# Patient Record
Sex: MALE | Race: WHITE
[De-identification: names, ages, dates, MRNs, and addresses within clinical notes are randomized per-mention and may not be internally consistent; named-entity substitution may affect disease eponyms.]

---

## 2023-05-16 ENCOUNTER — HOSPITAL ENCOUNTER (OUTPATIENT)
Dept: HOSPITAL 47 - RADXRMAIN | Age: 63
Discharge: HOME | End: 2023-05-16
Attending: EMERGENCY MEDICINE
Payer: COMMERCIAL

## 2023-05-16 DIAGNOSIS — Z87.39: ICD-10-CM

## 2023-05-16 DIAGNOSIS — S83.92XA: Primary | ICD-10-CM

## 2023-05-16 DIAGNOSIS — M17.12: ICD-10-CM

## 2023-05-16 NOTE — XR
EXAMINATION TYPE: XR knee complete LT

 

DATE OF EXAM: 5/16/2023

 

COMPARISON: None

 

HISTORY: 63-year-old male S83.92 XA, left knee sprain

 

TECHNIQUE: 3 views

 

FINDINGS:  

There is a small knee joint effusion. Minimal degenerative spurring patellofemoral compartment. No ac
malissa fracture, subluxation, dislocation.

 

IMPRESSION:  

There appears to be a small effusion in the knee which is nonspecific. Minimal degenerative spurring 
in the patellofemoral compartment. No acute osseous abnormality seen.

## 2024-11-10 ENCOUNTER — HOSPITAL ENCOUNTER (EMERGENCY)
Dept: HOSPITAL 47 - EC | Age: 64
Discharge: HOME | End: 2024-11-10
Payer: COMMERCIAL

## 2024-11-10 VITALS — TEMPERATURE: 98.5 F

## 2024-11-10 VITALS — DIASTOLIC BLOOD PRESSURE: 88 MMHG | SYSTOLIC BLOOD PRESSURE: 162 MMHG | RESPIRATION RATE: 16 BRPM | HEART RATE: 73 BPM

## 2024-11-10 DIAGNOSIS — Z88.0: ICD-10-CM

## 2024-11-10 DIAGNOSIS — S39.012A: Primary | ICD-10-CM
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
## 2024-11-10 LAB
GRAN CASTS UR QL COMP ASSIST: 5 /LPF
MIXED CELL CASTS UR QL COMP ASSIST: 5 /LPF
PH UR: 5.5 [PH] (ref 5–8)
RBC UR QL: <1 /HPF (ref 0–5)
SP GR UR: 1.02 (ref 1–1.03)
SQUAMOUS UR QL AUTO: <1 /HPF (ref 0–4)
UROBILINOGEN UR QL STRIP: <2 MG/DL (ref ?–2)
WBC #/AREA URNS HPF: 6 /HPF (ref 0–5)

## 2024-11-10 PROCEDURE — 99283 EMERGENCY DEPT VISIT LOW MDM: CPT

## 2024-11-10 PROCEDURE — 81001 URINALYSIS AUTO W/SCOPE: CPT

## 2024-11-10 PROCEDURE — 96372 THER/PROPH/DIAG INJ SC/IM: CPT

## 2024-11-10 RX ADMIN — ORPHENADRINE CITRATE STA MG: 30 INJECTION, SOLUTION INTRAMUSCULAR; INTRAVENOUS at 21:23

## 2024-11-10 RX ADMIN — LIDOCAINE ONE PATCH: 4 PATCH TOPICAL at 21:25

## 2024-11-10 RX ADMIN — KETOROLAC TROMETHAMINE STA MG: 15 INJECTION, SOLUTION INTRAMUSCULAR; INTRAVENOUS at 22:51

## 2024-11-10 RX ADMIN — MORPHINE SULFATE STA MG: 4 INJECTION, SOLUTION INTRAMUSCULAR; INTRAVENOUS at 22:50

## 2024-11-28 ENCOUNTER — HOSPITAL ENCOUNTER (INPATIENT)
Dept: HOSPITAL 47 - EC | Age: 64
LOS: 6 days | Discharge: HOME | DRG: 552 | End: 2024-12-04
Attending: INTERNAL MEDICINE | Admitting: INTERNAL MEDICINE
Payer: COMMERCIAL

## 2024-11-28 DIAGNOSIS — T38.0X5A: ICD-10-CM

## 2024-11-28 DIAGNOSIS — L40.50: ICD-10-CM

## 2024-11-28 DIAGNOSIS — M51.16: ICD-10-CM

## 2024-11-28 DIAGNOSIS — R19.7: ICD-10-CM

## 2024-11-28 DIAGNOSIS — M47.26: Primary | ICD-10-CM

## 2024-11-28 DIAGNOSIS — K64.9: ICD-10-CM

## 2024-11-28 DIAGNOSIS — K92.1: ICD-10-CM

## 2024-11-28 DIAGNOSIS — G89.29: ICD-10-CM

## 2024-11-28 DIAGNOSIS — Z79.899: ICD-10-CM

## 2024-11-28 DIAGNOSIS — M43.16: ICD-10-CM

## 2024-11-28 DIAGNOSIS — M48.062: ICD-10-CM

## 2024-11-28 DIAGNOSIS — Z87.19: ICD-10-CM

## 2024-11-28 DIAGNOSIS — Z86.61: ICD-10-CM

## 2024-11-28 DIAGNOSIS — K59.00: ICD-10-CM

## 2024-11-28 DIAGNOSIS — I15.8: ICD-10-CM

## 2024-11-28 LAB
ANION GAP SERPL CALC-SCNC: 8 MMOL/L
BASOPHILS # BLD AUTO: 0 K/UL (ref 0–0.2)
BASOPHILS NFR BLD AUTO: 0 %
BUN SERPL-SCNC: 21 MG/DL (ref 9–20)
CALCIUM SPEC-MCNC: 9.4 MG/DL (ref 8.4–10.2)
CHLORIDE SERPL-SCNC: 99 MMOL/L (ref 98–107)
CO2 SERPL-SCNC: 28 MMOL/L (ref 22–30)
EOSINOPHIL # BLD AUTO: 0.1 K/UL (ref 0–0.7)
EOSINOPHIL NFR BLD AUTO: 0 %
ERYTHROCYTE [DISTWIDTH] IN BLOOD BY AUTOMATED COUNT: 5.01 M/UL (ref 4.3–5.9)
ERYTHROCYTE [DISTWIDTH] IN BLOOD: 12.2 % (ref 11.5–15.5)
GLUCOSE SERPL-MCNC: 99 MG/DL (ref 74–99)
HCT VFR BLD AUTO: 45.1 % (ref 39–53)
HGB BLD-MCNC: 15.5 GM/DL (ref 13–17.5)
LYMPHOCYTES # SPEC AUTO: 2.3 K/UL (ref 1–4.8)
LYMPHOCYTES NFR SPEC AUTO: 16 %
MCH RBC QN AUTO: 30.8 PG (ref 25–35)
MCHC RBC AUTO-ENTMCNC: 34.3 G/DL (ref 31–37)
MCV RBC AUTO: 90 FL (ref 80–100)
MONOCYTES # BLD AUTO: 0.8 K/UL (ref 0–1)
MONOCYTES NFR BLD AUTO: 6 %
NEUTROPHILS # BLD AUTO: 10.6 K/UL (ref 1.3–7.7)
NEUTROPHILS NFR BLD AUTO: 76 %
PLATELET # BLD AUTO: 282 K/UL (ref 150–450)
POTASSIUM SERPL-SCNC: 3.2 MMOL/L (ref 3.5–5.1)
SODIUM SERPL-SCNC: 135 MMOL/L (ref 137–145)
WBC # BLD AUTO: 13.9 K/UL (ref 3.8–10.6)

## 2024-11-28 PROCEDURE — 96376 TX/PRO/DX INJ SAME DRUG ADON: CPT

## 2024-11-28 PROCEDURE — 85025 COMPLETE CBC W/AUTO DIFF WBC: CPT

## 2024-11-28 PROCEDURE — 83735 ASSAY OF MAGNESIUM: CPT

## 2024-11-28 PROCEDURE — 96375 TX/PRO/DX INJ NEW DRUG ADDON: CPT

## 2024-11-28 PROCEDURE — 99285 EMERGENCY DEPT VISIT HI MDM: CPT

## 2024-11-28 PROCEDURE — 96374 THER/PROPH/DIAG INJ IV PUSH: CPT

## 2024-11-28 PROCEDURE — 83540 ASSAY OF IRON: CPT

## 2024-11-28 PROCEDURE — 62323 NJX INTERLAMINAR LMBR/SAC: CPT

## 2024-11-28 PROCEDURE — 82272 OCCULT BLD FECES 1-3 TESTS: CPT

## 2024-11-28 PROCEDURE — 82728 ASSAY OF FERRITIN: CPT

## 2024-11-28 PROCEDURE — 80048 BASIC METABOLIC PNL TOTAL CA: CPT

## 2024-11-28 PROCEDURE — 83550 IRON BINDING TEST: CPT

## 2024-11-28 RX ADMIN — HYDROMORPHONE HYDROCHLORIDE STA MG: 1 INJECTION, SOLUTION INTRAMUSCULAR; INTRAVENOUS; SUBCUTANEOUS at 10:25

## 2024-11-28 RX ADMIN — CYCLOBENZAPRINE HYDROCHLORIDE PRN MG: 10 TABLET, FILM COATED ORAL at 16:33

## 2024-11-28 RX ADMIN — KETOROLAC TROMETHAMINE STA MG: 15 INJECTION, SOLUTION INTRAMUSCULAR; INTRAVENOUS at 09:33

## 2024-11-28 RX ADMIN — DOCUSATE SODIUM AND SENNOSIDES SCH EACH: 50; 8.6 TABLET ORAL at 18:29

## 2024-11-28 RX ADMIN — HYDROMORPHONE HYDROCHLORIDE PRN MG: 1 INJECTION, SOLUTION INTRAMUSCULAR; INTRAVENOUS; SUBCUTANEOUS at 12:23

## 2024-11-28 RX ADMIN — DEXTROSE STA MG: 50 INJECTION, SOLUTION INTRAVENOUS at 12:21

## 2024-11-28 RX ADMIN — PANTOPRAZOLE SODIUM SCH MG: 40 INJECTION, POWDER, FOR SOLUTION INTRAVENOUS at 12:21

## 2024-11-28 RX ADMIN — ORPHENADRINE CITRATE STA MG: 30 INJECTION, SOLUTION INTRAMUSCULAR; INTRAVENOUS at 09:33

## 2024-11-28 RX ADMIN — FAMOTIDINE SCH MG: 20 TABLET, FILM COATED ORAL at 20:00

## 2024-11-28 RX ADMIN — KETOROLAC TROMETHAMINE PRN MG: 15 INJECTION, SOLUTION INTRAMUSCULAR; INTRAVENOUS at 15:40

## 2024-11-28 RX ADMIN — HYDROMORPHONE HYDROCHLORIDE STA MG: 1 INJECTION, SOLUTION INTRAMUSCULAR; INTRAVENOUS; SUBCUTANEOUS at 09:33

## 2024-11-28 RX ADMIN — DEXAMETHASONE SODIUM PHOSPHATE SCH MG: 4 INJECTION, SOLUTION INTRAMUSCULAR; INTRAVENOUS at 19:59

## 2024-11-28 RX ADMIN — HEPARIN SODIUM SCH UNIT: 5000 INJECTION, SOLUTION INTRAVENOUS; SUBCUTANEOUS at 19:59

## 2024-11-28 NOTE — P.HPIM
History of Present Illness





This is a pleasant 64 years old male who presents because of worsening back 

pain, wife at bedside


Patient states that he has back pain for 25 years.  Usually treated with topical

agents and follow-up with chiropractor


Since beginning of November about 1 month ago his pain was starting getting more

severe and was shooting sharp pain radiating to his right leg down to the knee. 

It was gradually getting worse.


He saw his orthopedic Dr. Browning in the outpatient on Monday who prescribed him 

Norco and prednisone 20 mg.


Today patient was taking a shower on his knees and hands because he cannot sit 

while he was trying to stand up his pain was more severe and sharp so he decided

to come to the emergency room


Patient is in mild to moderate distress because of his pain, stating currently 

7-8/10 in severity.


He denies weakness in his legs.  He denies losing control of his bowel or urine.

 He denies numbness in the saddle area but sometimes he got limb numbness, he 

thinks he has little numbness in the small area on his right thigh.


He denies smoking alcohol or illicit drugs


No chest pain or dyspnea.  No other GI/ symptoms.


Vitals are stable and he is afebrile.  Urine analysis showing no signs of 

infection


Currently there is no labs or imaging done





Review of Systems





Review of systems


CONSTITUTIONAL: No fever, no malaise, no fatigue. 


HEENT: No recent visual problems or hearing problems. Denied any sore throat. 


CARDIOVASCULAR: No  orthopnea, PND, no palpitations, no syncope. 


PULMONARY: No shortness of breath, no cough, no hemoptysis. 


GASTROINTESTINAL: No diarrhea, no nausea, no vomiting, no abdominal pain. 

Normoactive bowel sounds. 


NEUROLOGICAL: No headaches, no weakness, no numbness. 


HEMATOLOGICAL: Denies any bleeding or petechiae. 


GENITOURINARY: Denies any burning micturition, frequency, or urgency. 


MUSCULOSKELETAL/RHEUMATOLOGICAL: As above


ENDOCRINE: Denies any polyuria or polydipsia.








Past Medical History


Additional Past Medical History / Comment(s): PSORIATIC ARTHRITIS, HX 

ENCEPHALITIS AS CHILD


History of Any Multi-Drug Resistant Organisms: None Reported


Past Surgical History: Appendectomy, Hernia Repair, Orthopedic Surgery


Additional Past Surgical History / Comment(s): LEFT WRIST CYST REMOVED, RT THUMB

SX, "GLAND" REMOVED FROM NECK , left knee


Past Anesthesia/Blood Transfusion Reactions: No Reported Reaction


Past Psychological History: No Psychological Hx Reported


Smoking Status: Never smoker


Past Alcohol Use History: Occasional


Past Drug Use History: Marijuana





- Past Family History


  ** Mother


Family Medical History: No Reported History





Medications and Allergies


                                Home Medications











 Medication  Instructions  Recorded  Confirmed  Type


 


Etanercept [Enbrel] 50 mg SQ SA 09/20/16 11/28/24 History


 


Cyclobenzaprine [Flexeril] 10 mg PO TID PRN #15 tab 11/10/24 11/28/24 Rx


 


Famotidine [Pepcid] 20 mg PO BID 11/28/24 11/28/24 History


 


HYDROcodone/APAP 7.5-325MG [Norco 1 tab PO Q6H PRN 11/28/24 11/28/24 History





7.5-325]    


 


predniSONE [Deltasone] See Taper PO AS DIRECTED 11/28/24 11/28/24 History








                                    Allergies











Allergy/AdvReac Type Severity Reaction Status Date / Time


 


Penicillins Allergy  Rash/Hives Verified 11/28/24 11:28














Physical Exam


Vitals: 


                                   Vital Signs











  Temp Pulse Resp BP Pulse Ox


 


 11/28/24 10:19   78  20  162/104  97


 


 11/28/24 09:07  97.7 F  111 H  20  164/108  99








                                Intake and Output











 11/27/24 11/28/24 11/28/24





 22:59 06:59 14:59


 


Other:   


 


  Weight   83.915 kg














GENERAL: The patient is alert and oriented x3, not in any acute distress. Well 

developed, well nourished. 


HEENT: Pupils are round and equally reacting to light. EOMI. No scleral icterus.

 No conjunctival pallor. Normocephalic, atraumatic. No pharyngeal erythema. No 

thyromegaly. 


CARDIOVASCULAR: S1 and S2 present. No murmurs, rubs, or gallops. 


PULMONARY: Chest is clear to auscultation, no wheezing , no crackles. 


ABDOMEN: Soft, nontender, nondistended, normoactive bowel sounds. No palpable 

organomegaly. 


-MUSCULOSKELETAL: No joint swelling or deformity.  Patient moving his legs 

frequently because of pain.  No overt weakness


EXTREMITIES: No cyanosis, clubbing, or pedal edema. 


NEUROLOGICAL: Gross neurological examination did not reveal any focal deficits. 


SKIN: No rashes. no petechiae.








Assessment and Plan


Assessment: 





Severe acute on chronic back pain radiating to the right leg


History of lumbar spondylolisthesis as per patient








Plan: 





Continue with the steroids.


Pain management


Orthopedic team consult


Follow-up labs


Labs and medication were reviewed..  Continue same treatment.  Continue with 

symptomatic treatment.  Resume home medication.  Monitor labs and vitals.  DVT 

and GI prophylaxis.  Further recommendations as per clinical course of the 

patient


DVT prophylaxis: Subcutaneous heparin


GI Prophylaxis: Ppi


PT/OT: Pending, deferred


Prognosis is guarded

## 2024-11-28 NOTE — ED
General Adult HPI





- General


Chief complaint: Back Pain/Injury


Stated complaint: Lower back pain


Time Seen by Provider: 11/28/24 09:08


Source: patient, family, EMS, RN notes reviewed


Mode of arrival: EMS


Limitations: no limitations





- History of Present Illness


Initial comments: 





Patient is a 64-year-old male present to the emergency department with concerns 

with lower back pain.  Onset of symptoms was close to a month ago.  Patient has 

discomfort of his lower back radiating to the right leg.  No incontinence or 

retention of bowel or bladder.  No leg weakness or loss of sensation.  Patient 

did have similar episode around 25 years ago.  Patient has seen a back doctor in

[place] as well as Dr. Browning here.  Patient has also been to the emergency depar

tment 1 time.  Discomfort is severe at this time.  Discomfort got worse while 

standing up today.





- Related Data


                                Home Medications











 Medication  Instructions  Recorded  Confirmed


 


Etanercept [Enbrel] 50 mg SQ SA 09/20/16 11/28/24


 


Famotidine [Pepcid] 20 mg PO BID 11/28/24 11/28/24


 


HYDROcodone/APAP 7.5-325MG [Norco 1 tab PO Q6H PRN 11/28/24 11/28/24





7.5-325]   


 


predniSONE [Deltasone] See Taper PO AS DIRECTED 11/28/24 11/28/24








                                  Previous Rx's











 Medication  Instructions  Recorded


 


Cyclobenzaprine [Flexeril] 10 mg PO TID PRN #15 tab 11/10/24











                                    Allergies











Allergy/AdvReac Type Severity Reaction Status Date / Time


 


Penicillins Allergy  Rash/Hives Verified 11/28/24 11:28














Review of Systems


ROS Statement: 


Those systems with pertinent positive or pertinent negative responses have been 

documented in the HPI.





ROS Other: All systems not noted in ROS Statement are negative.


Constitutional: Denies: fever


Eyes: Denies: eye pain


ENT: Denies: ear pain


Respiratory: Denies: cough


Cardiovascular: Denies: chest pain


Endocrine: Denies: fatigue


Gastrointestinal: Denies: abdominal pain


Musculoskeletal: Reports: as per HPI, back pain


Neurological: Denies: weakness, numbness, paresthesias





Past Medical History


Additional Past Medical History / Comment(s): PSORIATIC ARTHRITIS, HX 

ENCEPHALITIS AS CHILD


History of Any Multi-Drug Resistant Organisms: None Reported


Past Surgical History: Appendectomy, Hernia Repair, Orthopedic Surgery


Additional Past Surgical History / Comment(s): LEFT WRIST CYST REMOVED, RT THUMB

 SX, "GLAND" REMOVED FROM NECK , left knee


Past Anesthesia/Blood Transfusion Reactions: No Reported Reaction


Past Psychological History: No Psychological Hx Reported


Smoking Status: Never smoker


Past Alcohol Use History: Occasional


Past Drug Use History: Marijuana





- Past Family History


  ** Mother


Family Medical History: No Reported History





General Exam


Limitations: no limitations


General appearance: alert


Head exam: Present: atraumatic, normocephalic


Eye exam: Present: normal appearance


Neck exam: Present: normal inspection


Respiratory exam: Present: normal lung sounds bilaterally


Cardiovascular Exam: Present: regular rate, normal rhythm


  ** Expanded


Peripheral pulses: 2+: Dorsalis Pedis (R), Dorsalis Pedis (L)


GI/Abdominal exam: Present: soft.  Absent: distended, tenderness, pulsatile mass


Extremities exam: Present: normal inspection


Back exam: Present: vertebral tenderness (Mild tenderness lower lumbar region)


Neurological exam: Present: alert.  Absent: motor sensory deficit


  ** Expanded


Sensory exam: Lower Extremity Light Touch: Normal


Motor strength exam: RLE: 5, LLE: 5


Eye Response: (4) open spontaneously


Motor Response: (6) obeys commands


Verbal Response: (5) oriented


Psychiatric exam: Present: normal affect, normal mood


Skin exam: Present: normal color





Course


                                   Vital Signs











  11/28/24 11/28/24





  09:07 10:19


 


Temperature 97.7 F 


 


Pulse Rate 111 H 78


 


Respiratory 20 20





Rate  


 


Blood Pressure 164/108 162/104


 


O2 Sat by Pulse 99 97





Oximetry  














Medical Decision Making





- Medical Decision Making





Was pt. sent in by a medical professional or institution (, PA, NP, urgent 

care, hospital, or nursing home...) When possible be specific


@  -No


Did you speak to anyone other than the patient for history (EMS, parent, family,

 police, friend...)? What history was obtained from this source 


@  -Wife is present and provides history including that she does not feel she is

 able to take care of him at home anymore


Did you review nursing and triage notes (agree or disagree)?  Why? 


@  -I reviewed and agree with nursing and triage notes


Were old charts reviewed (outside hosp., previous admission, EMS record, old 

EKG, old radiological studies, urgent care reports/EKG's, nursing home records)?

 Report findings 


@  -Reviewed previous x-ray results showing degenerative disc disease


Differential Diagnosis (chest pain, altered mental status, abdominal pain women,

 abdominal pain men, vaginal bleeding, weakness, fever, dyspnea, syncope, 

headache, dizziness, GI bleed, back pain, seizure, CVA, palpatations, mental 

health, musculoskeletal)? 


@  -Differential Back Pain:


Strain, zoster, cauda equina syndrome, epidural abscess, vertebral 

osteomyelitis, discitis, fracture, subluxation, disc herniation, DJD, spinal 

stenosis, dissection, AAA, pancreatitis, peptic ulcer disease, pyelonephritis, 

kidney stone, this is not meant to be an all-inclusive list.





EKG interpreted by me (3pts min.).


@  -As above


X-rays interpreted by me (1pt min.).


@  -None done


CT interpreted by me (1pt min.).


@  -None done


U/S interpreted by me (1pt. min.).


@  -None done


What testing was considered but not performed or refused? (CT, X-rays, U/S, 

labs)? Why?


@  -Considered imaging of the spine however patient has had previous x-ray and 

MRI


What meds were considered but not given or refused? Why?


@  -None


Did you discuss the management of the patient with other professionals 

(professionals i.e. , PA, NP, lab, RT, psych nurse, , , 

teacher, , )? Give summary


@  -Case was discussed with Dr. Spain who will admit covering hospital call


Was smoking cessation discussed for >3mins.?


@  -No


Was critical care preformed (if so, how long)?


@  -No


Were there social determinants of health that impacted care today? How? 

(Homelessness, low income, unemployed, alcoholism, drug addiction, 

transportation, low edu. Level, literacy, decrease access to med. care, group home, 

rehab)?


@  -No


Was there de-escalation of care discussed even if they declined (Discuss DNR or 

withdrawal of care, Hospice)? DNR status


@  -No


What co-morbidities impacted this encounter? (DM, HTN, Smoking, COPD, CAD, 

Cancer, CVA, ARF, Chemo, Hep., AIDS, mental health diagnosis, sleep apnea, 

morbid obesity)?


@  -None


Was patient admitted / discharged? Hospital course, mention meds given and 

route, prescriptions, significant lab abnormalities, going to OR and other 

pertinent info.


@  -Patient presents with increasing lower back pain.  Patient feels pain is 

severe and unable to go home.  Patient will be admitted with orthospine consult.

  Admission orders written.  Patient and family updated.


Undiagnosed new problem with uncertain prognosis?


@  -No


Drug Therapy requiring intensive monitoring for toxicity (Heparin, Nitro, 

Insulin, Cardizem)?


@  -No


Were any procedures done?


@  -No


Diagnosis/symptom?


@  -Back pain


Acute, or Chronic, or Acute on Chronic?


@  -Acute on chronic


Uncomplicated (without systemic symptoms) or Complicated (systemic symptoms)?


@  -Default


Side effects of treatment?


@  -No


Exacerbation, Progression, or Severe Exacerbation?


@  -No


Poses a threat to life or bodily function? How? (Chest pain, USA, MI, pneumonia,

 PE, COPD, DKA, ARF, appy, cholecystitis, CVA, Diverticulitis, Homicidal, 

Suicidal, threat to staff... and all critical care pts)


@  -No








Disposition


Clinical Impression: 


 Back pain





Disposition: ADMITTED AS IP TO THIS HOSP


Is patient prescribed a controlled substance at d/c from ED?: No


Referrals: 


None,Stated [Primary Care Provider] - 1-2 days


Time of Disposition: 11:55

## 2024-11-29 NOTE — P.CNOR
History of Present Illness





- HPI


Consult date: 11/29/24


Consult reason: low back pain


History of present illness: 





Patient is a pleasant 64-year-old male who is seen and examined at bedside.  He 

is accompanied by his wife.  He presented due to worsening low back pain.  He 

has a long history of back pain for the past 25 years but he has had acute 

flareup of the past 1 month since the beginning of November.  He says pain 

became sharp and shooting down his legs particular down his right leg to his 

thigh and his knee and has been getting worse.  We saw him in the office this 

past week and we started him on some medication and steroid medication.  He was 

not having any benefit and yesterday when he was trying to shower he felt severe

sharp pain in his back and his leg and presented to the emergency room.


He feels incapacitated due to his pain.  He does not feel weak in his legs.  He 

denies any changes bowel bladder function.  He says the pain is primarily in his

back towards the right buttocks and his right thigh.  He denies any weakness in 

his upper extremities or his neck.  He denies any chest pain shortness of 

breath.  Denies any nausea or vomiting.  He normally works but has not worked 

for the past month.


He had imaging at our office which showed listhesis and facet arthrosis with 

stenosis at the lower lumbar spine.





Review of Systems





As per HPI.  He denies any GI or  symptoms.  He is afebrile.  He denies any 

smoking or illicit drugs.  He denies any recent infections.  Denies any weakness

in his lower extremities.  He denies any new trauma.





Past Medical History


Additional Past Medical History / Comment(s): PSORIATIC ARTHRITIS, HX 

ENCEPHALITIS AS CHILD


History of Any Multi-Drug Resistant Organisms: None Reported


Past Surgical History: Appendectomy, Hernia Repair, Orthopedic Surgery


Additional Past Surgical History / Comment(s): LEFT WRIST CYST REMOVED, RT THUMB

SX, "GLAND" REMOVED FROM NECK , left knee


Past Anesthesia/Blood Transfusion Reactions: No Reported Reaction


Past Psychological History: No Psychological Hx Reported


Smoking Status: Never smoker


Past Alcohol Use History: Occasional


Past Drug Use History: Marijuana


Additional Drug Use History / Comment(s): OCCASIONAL USE, INSTRUCTED TO WITHOLD 

24 HRS PRIOR TO PROCEDURE states last marijuana was yesterday





- Past Family History


  ** Mother


Family Medical History: No Reported History





Medications and Allergies


                                Home Medications











 Medication  Instructions  Recorded  Confirmed  Type


 


Etanercept [Enbrel] 50 mg SQ SA 09/20/16 11/28/24 History


 


Cyclobenzaprine [Flexeril] 10 mg PO TID PRN #15 tab 11/10/24 11/28/24 Rx


 


Famotidine [Pepcid] 20 mg PO BID 11/28/24 11/28/24 History


 


HYDROcodone/APAP 7.5-325MG [Norco 1 tab PO Q6H PRN 11/28/24 11/28/24 History





7.5-325]    


 


predniSONE [Deltasone] See Taper PO AS DIRECTED 11/28/24 11/28/24 History








                                    Allergies











Allergy/AdvReac Type Severity Reaction Status Date / Time


 


Penicillins Allergy  Rash/Hives Verified 11/28/24 11:28














Physical Examination


Osteopathic Statement: *.  No significant issues noted on an osteopathic 

structural exam other than those noted in the History and Physical/Consult.





- L Spine: dermatomal strength & reflexes


  ** bilateral


Strength: hip flexion: 5/5 (His back has significant pain when he tries to move 

or change positions.  He has severe paravertebral spasm.  There is no open 

wounds lacerations or abrasions.)


Strength: knee flexion: 5/5 (He has 5 of 5 dorsiflexion plantarflexion EHL hip 

flexion knee extension.  No pain with internal extra rotation of his hips.  

Pelvis stable.  Abdomen soft nontender.)


Strength: knee extension: 5/5 (Chest has good excursion deep inspiration 

expiration next nontender to palpation range of motion.  Upper extremities have 

full active and passive range of motion intact)





Results





- Labs


Labs: 


                                      H & H











  11/28/24 Range/Units





  12:23 


 


Hgb  15.5  (13.0-17.5)  gm/dL


 


Hct  45.1  (39.0-53.0)  %











Result Diagrams: 


                                 11/28/24 12:23





                                 11/28/24 12:23





- Diagnostic results


Lumbar AP/lateral x-ray: report reviewed, image reviewed (He is old images from 

2023 of his lumbar spine but had new images at our office.  He has severe disc 

degeneration L4-5 L5-S1 with retrolisthesis L4-5 and spondylolisthesis L5-S1.  

There is spondylolysis at L5 bilaterally)





Assessment and Plan


Assessment: 





Acute on chronic low back pain, severe


Inability to mobilize due to back pain


Spondylolisthesis L4-5 L5-S1


Degenerative disc disease L4-5 L5-S1


Diffuse spondylosis lower lumbar spine without neurologic deficit


Lower extremity radiculopathy worse on the right than the left


Plan: 





Acute on chronic low back pain, severe


Inability to mobilize due to back pain


Spondylolisthesis L4-5 L5-S1


Degenerative disc disease L4-5 L5-S1


Diffuse spondylosis lower lumbar spine without neurologic deficit


Lower extremity radiculopathy worse on the right than the left








The patient has a number of changes at his lower lumbar spine which correlate 

well with his low back symptoms.  He has had acute exacerbation of his symptoms 

which have been unrelenting for him over the past 30 days and he feels like they

are worsening.  He has been trying oral steroid medication however this seems to

be failing him.  He is not having acute neurologic loss or deficit in his lower 

extremities.





His symptoms correlate well with his imaging findings which were done on 

outpatient outpatient basis.  I think he is a candidate for further interventi

on.  He may have some benefit with interventional pain management in the form of

epidural steroid or facet injections.  It is difficult to predict how long this 

may give him relief but I think is a reasonable option for him to see if it 

allows him to settle down some of his symptoms and regain some of his 

activities.





The patient is a candidate for surgical intervention in the form of decompressio

n and fusion at L4-5 and L5-S1.  I explained the nature of surgery with him.  I 

explained the risk complications all terms of benefits at length.  I answered 

his questions best my ability limb she understand.





At this point he is interested in continuing with conservative treatment and 

interventional pain management.  He understands that surgery may be a reasonable

option for him if he is not having significant improvement and continues to have

severe issues.  Will continue to follow him closely.





He will continue with the IV steroid medications pain medication and physical 

therapy did not help with his mobilization.

## 2024-11-29 NOTE — P.PN
Subjective





This is a pleasant 64 years old male who presents because of worsening back 

pain, wife at bedside


Patient states that he has back pain for 25 years.  Usually treated with topical

agents and follow-up with chiropractor


Since beginning of November about 1 month ago his pain was starting getting more

severe and was shooting sharp pain radiating to his right leg down to the knee. 

It was gradually getting worse.


He saw his orthopedic Dr. Browning in the outpatient on Monday who prescribed him 

Norco and prednisone 20 mg.


Today patient was taking a shower on his knees and hands because he cannot sit 

while he was trying to stand up his pain was more severe and sharp so he decided

to come to the emergency room


Patient is in mild to moderate distress because of his pain, stating currently 

7-8/10 in severity.


He denies weakness in his legs.  He denies losing control of his bowel or urine.

 He denies numbness in the saddle area but sometimes he got limb numbness, he 

thinks he has little numbness in the small area on his right thigh.


He denies smoking alcohol or illicit drugs


No chest pain or dyspnea.  No other GI/ symptoms.


Vitals are stable and he is afebrile.  Urine analysis showing no signs of 

infection


Currently there is no labs or imaging done





11/29


Patient still with severe low back pain as of yesterday


Despite his being on dexamethasone steroid and Dilaudid and Flexeril


His pain is worse when he tried to go to the restroom.


No other new complaints.








Objective





- Vital Signs


Vital signs: 


                                   Vital Signs











Temp  98.1 F   11/29/24 07:00


 


Pulse  97   11/29/24 07:00


 


Resp  16   11/29/24 07:00


 


BP  159/95   11/29/24 08:00


 


Pulse Ox  97   11/29/24 07:00


 


FiO2      








                                 Intake & Output











 11/28/24 11/29/24 11/29/24





 18:59 06:59 18:59


 


Intake Total  480 


 


Balance  480 


 


Weight 83.915 kg  


 


Intake:   


 


  Oral  480 


 


Other:   


 


  Voiding Method  Toilet 


 


  # Voids  3 














- Exam





GENERAL: The patient is alert and oriented x3, not in any acute distress. Well 

developed, well nourished. 


HEENT: Pupils are round and equally reacting to light. EOMI. No scleral icterus.

No conjunctival pallor. Normocephalic, atraumatic. No pharyngeal erythema. No 

thyromegaly. 


CARDIOVASCULAR: S1 and S2 present. No murmurs, rubs, or gallops. 


PULMONARY: Chest is clear to auscultation, no wheezing , no crackles. 


ABDOMEN: Soft, nontender, nondistended, normoactive bowel sounds. No palpable 

organomegaly. 


MUSCULOSKELETAL: No joint swelling or deformity. 


EXTREMITIES: No cyanosis, clubbing, or pedal edema. 


NEUROLOGICAL: Gross neurological examination did not reveal any focal deficits. 


SKIN: No rashes. no petechiae.








- Labs


CBC & Chem 7: 


                                 11/28/24 12:23





                                 11/28/24 12:23


Labs: 


                  Abnormal Lab Results - Last 24 Hours (Table)











  11/28/24 11/28/24 Range/Units





  12:23 12:23 


 


WBC  13.9 H   (3.8-10.6)  k/uL


 


Neutrophils #  10.6 H   (1.3-7.7)  k/uL


 


Sodium   135 L  (137-145)  mmol/L


 


Potassium   3.2 L  (3.5-5.1)  mmol/L


 


BUN   21 H  (9-20)  mg/dL














Assessment and Plan


Assessment: 





Severe acute on chronic back pain radiating to the right leg


History of lumbar spondylolisthesis as per patient








Plan: 





Continue with the steroids.


Pain management


Orthopedic team consult


Follow-up labs


Labs and medication were reviewed..  Continue same treatment.  Continue with 

symptomatic treatment.  Resume home medication.  Monitor labs and vitals.  DVT 

and GI prophylaxis.  Further recommendations as per clinical course of the 

patient


DVT prophylaxis: Subcutaneous heparin


GI Prophylaxis: Ppi


PT/OT: Pending, deferred


Prognosis is guarded benadryl 50 mg ,decadron 12 mg ivp/IV/medications

## 2024-11-30 LAB
ANION GAP SERPL CALC-SCNC: 12 MMOL/L (ref 4–12)
BUN SERPL-SCNC: 25.8 MG/DL (ref 9–27)
BUN/CREAT SERPL: 25.8 RATIO (ref 12–20)
CALCIUM SPEC-MCNC: 9.7 MG/DL (ref 8.7–10.3)
CHLORIDE SERPL-SCNC: 100 MMOL/L (ref 96–109)
CO2 SERPL-SCNC: 26 MMOL/L (ref 21.6–31.8)
GLUCOSE SERPL-MCNC: 127 MG/DL (ref 70–110)
MAGNESIUM SPEC-SCNC: 2.2 MG/DL (ref 1.5–2.4)
POTASSIUM SERPL-SCNC: 4.6 MMOL/L (ref 3.5–5.5)
SODIUM SERPL-SCNC: 138 MMOL/L (ref 135–145)

## 2024-11-30 RX ADMIN — ASPIRIN SCH: 325 TABLET ORAL at 08:44

## 2024-11-30 NOTE — P.PN
Progress Note - Text


Progress Note Date: 11/30/24





The patient is seen and examined bedside.  His wife is present with him.  He 

says he had a better night last night.  He still requiring regular medications. 

But he has been getting up to get to the bathroom but then back in bed.  He 

feels that is about as far as he can walk.





He is afebrile stable vital signs


His lower extremities have 5-5 muscle strength dorsiflexion plantarflexion EHL 

hip flexion knee extension.  Calves thighs soft nontender








Acute on chronic low back pain


Spondylolisthesis L4-5 L5-S1


Degenerative disc disease


Lower EXTR and radiculopathy








The patient may have had some improvement with his pain symptoms.  It is 

possible that the steroid IV is giving him some relief.  Hopefully he will be 

able to continue to mobilize and make gains in terms of his pain control.  Pain 

management has been consulted for the possibility of epidural steroid injection 

which the patient plans to proceed with when they are available on Monday.

## 2024-11-30 NOTE — P.PN
Subjective





This is a pleasant 64 years old male who presents because of worsening back 

pain, wife at bedside


Patient states that he has back pain for 25 years.  Usually treated with topical

agents and follow-up with chiropractor


Since beginning of November about 1 month ago his pain was starting getting more

severe and was shooting sharp pain radiating to his right leg down to the knee. 

It was gradually getting worse.


He saw his orthopedic Dr. Browning in the outpatient on Monday who prescribed him 

Norco and prednisone 20 mg.


Today patient was taking a shower on his knees and hands because he cannot sit 

while he was trying to stand up his pain was more severe and sharp so he decided

to come to the emergency room


Patient is in mild to moderate distress because of his pain, stating currently 

7-8/10 in severity.


He denies weakness in his legs.  He denies losing control of his bowel or urine.

 He denies numbness in the saddle area but sometimes he got limb numbness, he 

thinks he has little numbness in the small area on his right thigh.


He denies smoking alcohol or illicit drugs


No chest pain or dyspnea.  No other GI/ symptoms.


Vitals are stable and he is afebrile.  Urine analysis showing no signs of 

infection


Currently there is no labs or imaging done





11/29


Patient still with severe low back pain as of yesterday


Despite his being on dexamethasone steroid and Dilaudid and Flexeril


His pain is worse when he tried to go to the restroom.


No other new complaints.








11/30


Patient back pain is slightly improving.  While continued on IV steroids


Orthopedic team evaluated the patient and recommended pain management service 

which is not available till Monday.  Patient agrees to stay


Patient have somewhat high blood pressure secondary to steroid effect.  Added 

Norvasc and hydralazine as needed





Objective





- Vital Signs


Vital signs: 


                                   Vital Signs











Temp  98.5 F   11/30/24 14:00


 


Pulse  92   11/30/24 14:09


 


Resp  16   11/30/24 14:09


 


BP  160/109   11/30/24 14:00


 


Pulse Ox  97   11/30/24 14:00


 


FiO2      








                                 Intake & Output











 11/29/24 11/30/24 11/30/24





 18:59 06:59 18:59


 


Intake Total  1040 573


 


Balance  1040 573


 


Intake:   


 


  Oral  1040 573


 


Other:   


 


  Voiding Method   Toilet


 


  # Voids 3 3 3


 


  # Bowel Movements   1














- Exam





GENERAL: The patient is alert and oriented x3, not in any acute distress. Well 

developed, well nourished. 


HEENT: Pupils are round and equally reacting to light. EOMI. No scleral icterus.

No conjunctival pallor. Normocephalic, atraumatic. No pharyngeal erythema. No 

thyromegaly. 


CARDIOVASCULAR: S1 and S2 present. No murmurs, rubs, or gallops. 


PULMONARY: Chest is clear to auscultation, no wheezing , no crackles. 


ABDOMEN: Soft, nontender, nondistended, normoactive bowel sounds. No palpable or

ganomegaly. 


MUSCULOSKELETAL: No joint swelling or deformity. 


EXTREMITIES: No cyanosis, clubbing, or pedal edema. 


NEUROLOGICAL: Gross neurological examination did not reveal any focal deficits. 


SKIN: No rashes. no petechiae.








- Labs


CBC & Chem 7: 


                                 11/28/24 12:23





                                 11/30/24 04:40


Labs: 


                  Abnormal Lab Results - Last 24 Hours (Table)











  11/30/24 Range/Units





  04:40 


 


BUN/Creatinine Ratio  25.80 H  (12.00-20.00)  Ratio


 


Glucose  127 H  ()  mg/dL














Assessment and Plan


Assessment: 





Severe acute on chronic back pain radiating to the right leg


History of lumbar spondylolisthesis as per patient


Hypertension, medication induced secondary to steroids





Plan: 





Continue with the steroids.


Pain management


Orthopedic team consult


Start Norvasc and hydralazine as needed


Labs and medication were reviewed..  Continue same treatment.  Continue with 

symptomatic treatment.  Resume home medication.  Monitor labs and vitals.  DVT 

and GI prophylaxis.  Further recommendations as per clinical course of the 

patient


DVT prophylaxis: Subcutaneous heparin


GI Prophylaxis: Ppi


PT/OT: Pending, deferred


Prognosis is guarded

## 2024-12-01 LAB
BASOPHILS # BLD AUTO: 0 K/UL (ref 0–0.2)
BASOPHILS NFR BLD AUTO: 0 %
EOSINOPHIL # BLD AUTO: 0 K/UL (ref 0–0.7)
EOSINOPHIL NFR BLD AUTO: 0 %
ERYTHROCYTE [DISTWIDTH] IN BLOOD BY AUTOMATED COUNT: 5.13 M/UL (ref 4.3–5.9)
ERYTHROCYTE [DISTWIDTH] IN BLOOD: 12.6 % (ref 11.5–15.5)
HCT VFR BLD AUTO: 45.4 % (ref 39–53)
HGB BLD-MCNC: 15.7 GM/DL (ref 13–17.5)
LYMPHOCYTES # SPEC AUTO: 0.7 K/UL (ref 1–4.8)
LYMPHOCYTES NFR SPEC AUTO: 4 %
MCH RBC QN AUTO: 30.6 PG (ref 25–35)
MCHC RBC AUTO-ENTMCNC: 34.6 G/DL (ref 31–37)
MCV RBC AUTO: 88.7 FL (ref 80–100)
MONOCYTES # BLD AUTO: 1.1 K/UL (ref 0–1)
MONOCYTES NFR BLD AUTO: 7 %
NEUTROPHILS # BLD AUTO: 12.9 K/UL (ref 1.3–7.7)
NEUTROPHILS NFR BLD AUTO: 87 %
PLATELET # BLD AUTO: 289 K/UL (ref 150–450)
WBC # BLD AUTO: 14.8 K/UL (ref 3.8–10.6)

## 2024-12-01 RX ADMIN — HYDROMORPHONE HYDROCHLORIDE PRN MG: 1 INJECTION, SOLUTION INTRAMUSCULAR; INTRAVENOUS; SUBCUTANEOUS at 15:17

## 2024-12-01 RX ADMIN — HYDROMORPHONE HYDROCHLORIDE PRN MG: 1 INJECTION, SOLUTION INTRAMUSCULAR; INTRAVENOUS; SUBCUTANEOUS at 04:26

## 2024-12-01 RX ADMIN — PANTOPRAZOLE SODIUM SCH MG: 40 INJECTION, POWDER, FOR SOLUTION INTRAVENOUS at 21:14

## 2024-12-01 NOTE — P.PN
Progress Note - Text


Progress Note Date: 12/01/24





Patient seen and examined.  He is not having new issues in his low back or his 

lower extremities but he does say that there is significant diarrhea with some 

blood in the diarrhea as well.  It is red blood.  He says his stomach is not 

feeling bad but he had to go multiple times overnight.





He is afebrile


Abdomen soft nontender


Extremities have 5 out of 5 strength with dorsiflexion plantarflexion EHL hip 

flexion knee extension











Acute on chronic low back pain


Spondylolisthesis L4-5 L5-S1


Spinal stenosis


Neurogenic claudication


Degenerative disc disease


New bloody diarrhea overnight











In terms the patient's low back we are waiting him to be seen from pain 

management.  He is a candidate for epidural steroid injections to see if that 

gives him some relief and possibly facet injections.  We again discussed this.  

He is continuing his medical management for his low back but if pain management 

were to fail to give him any significant relief over the next few weeks, then he

would be a candidate for surgical intervention.





He is going to get further workup in regards to his bloody diarrhea.  That 

medicine service is ordering labs and further workup and treatment.


They have held his stool softener for now

## 2024-12-01 NOTE — P.PN
Subjective





This is a pleasant 64 years old male who presents because of worsening back 

pain, wife at bedside


Patient states that he has back pain for 25 years.  Usually treated with topical

agents and follow-up with chiropractor


Since beginning of November about 1 month ago his pain was starting getting more

severe and was shooting sharp pain radiating to his right leg down to the knee. 

It was gradually getting worse.


He saw his orthopedic Dr. Browning in the outpatient on Monday who prescribed him 

Norco and prednisone 20 mg.


Today patient was taking a shower on his knees and hands because he cannot sit 

while he was trying to stand up his pain was more severe and sharp so he decided

to come to the emergency room


Patient is in mild to moderate distress because of his pain, stating currently 

7-8/10 in severity.


He denies weakness in his legs.  He denies losing control of his bowel or urine.

 He denies numbness in the saddle area but sometimes he got limb numbness, he 

thinks he has little numbness in the small area on his right thigh.


He denies smoking alcohol or illicit drugs


No chest pain or dyspnea.  No other GI/ symptoms.


Vitals are stable and he is afebrile.  Urine analysis showing no signs of 

infection


Currently there is no labs or imaging done





11/29


Patient still with severe low back pain as of yesterday


Despite his being on dexamethasone steroid and Dilaudid and Flexeril


His pain is worse when he tried to go to the restroom.


No other new complaints.








11/30


Patient back pain is slightly improving.  While continued on IV steroids


Orthopedic team evaluated the patient and recommended pain management service 

which is not available till Monday.  Patient agrees to stay


Patient have somewhat high blood pressure secondary to steroid effect.  Added 

Norvasc and hydralazine as needed








12/1


Patient was seen walking in his room.


Also reports some of blood in his stool which was witnessed by staff, it was 

small streaks.  Patient denies dizziness or chest pain or dyspnea.


Will do some anemia workup like occult blood in stool.  Increase Protonix to 

twice daily, discontinue subcutaneous heparin.


we will consult surgery team for his gi bleed as there is no gi coverage in this

 facility this weekend 





Review of systems


CONSTITUTIONAL: No fever, no malaise, no fatigue. 


HEENT: No recent visual problems or hearing problems. Denied any sore throat. 


CARDIOVASCULAR: No  orthopnea, PND, no palpitations, no syncope. 


PULMONARY: No shortness of breath, no cough, no hemoptysis. 


GASTROINTESTINAL: No diarrhea, no nausea, no vomiting, no abdominal pain. 

Normoactive bowel sounds. 


NEUROLOGICAL: No headaches, no weakness, no numbness. 





                               Active Medications











Generic Name Dose Route Start Last Admin





  Trade Name Freq  PRN Reason Stop Dose Admin


 


Acetaminophen  650 mg  11/28/24 11:55 





  Acetaminophen Tab 325 Mg Tab  PO  





  Q6HR PRN  





  Mild Pain or Fever > 100.5  


 


Amlodipine Besylate  5 mg  11/30/24 18:15  12/01/24 09:22





  Amlodipine 5 Mg Tab  PO   5 mg





  DAILY VIET   Administration


 


Cyclobenzaprine HCl  10 mg  11/28/24 11:56  12/01/24 09:22





  Cyclobenzaprine 10 Mg Tab  PO   10 mg





  TID PRN   Administration





  Muscle Spasm  


 


Dexamethasone Sodium Phosphate  4 mg  11/28/24 21:00  12/01/24 09:19





  Dexamethasone Sod Phosphate 4 Mg/Ml 1 Ml Vial  IVP   4 mg





  Q6H VIET   Administration


 


Hydralazine HCl  25 mg  11/30/24 18:10  12/01/24 09:25





  Hydralazine Hcl 25 Mg Tab  PO   25 mg





  QID PRN   Administration





  Blood Pressure - High  


 


Hydromorphone HCl  0.5 mg  12/01/24 12:22 





  Hydromorphone 0.5 Mg/0.5 Ml Syringe  IVP  





  Q3HR PRN  





  Severe Pain (Scale 7 to 10)  


 


Naloxone HCl  0.2 mg  11/28/24 11:55 





  Naloxone 0.4 Mg/Ml 1 Ml Vial  IV  





  Q2M PRN  





  Opioid Reversal  


 


Non-Formulary Medication  50 mg  11/30/24 09:00  11/30/24 08:44





  Etanercept [Enbrel]  SQ   Not Given





  SA [facility]  


 


Pantoprazole Sodium  40 mg  12/01/24 21:00 





  Pantoprazole 40 Mg/10 Ml Vial  IVP  





  BID [facility]  


 


Polyethylene Glycol  17 gm  11/29/24 13:00  12/01/24 09:22





  Polyethylene Glycol 3350 17 Gm Powd.Pack  PO   Not Given





  DAILY [facility]  


 


Senna/Docusate Sodium  1 each  11/28/24 18:15  12/01/24 09:23





  Sennosides-Docusate Sodium 1 Each Tab  PO   Not Given





  DAILY [facility]  


 


Tramadol HCl  50 mg  11/28/24 11:55  11/30/24 16:02





  Tramadol 50 Mg Tab  PO   50 mg





  Q6H PRN   Administration





  Moderate Pain (Scale 4 to 6)  














Objective





- Vital Signs


Vital signs: 


                                   Vital Signs











Temp  97.9 F   12/01/24 07:00


 


Pulse  110 H  12/01/24 07:00


 


Resp  16   12/01/24 07:00


 


BP  170/112   12/01/24 07:00


 


Pulse Ox  96   12/01/24 07:00


 


FiO2      








                                 Intake & Output











 11/30/24 12/01/24 12/01/24





 18:59 06:59 18:59


 


Intake Total 573 717 


 


Balance 573 717 


 


Intake:   


 


  Oral 573 717 


 


Other:   


 


  Voiding Method Toilet Toilet 


 


  # Voids 3 2 


 


  # Bowel Movements 1  














- Exam





GENERAL: The patient is alert and oriented x3, not in any acute distress. Well 

developed, well nourished. 


HEENT: Pupils are round and equally reacting to light. EOMI. No scleral icterus.

 No conjunctival pallor. Normocephalic, atraumatic. No pharyngeal erythema. No 

thyromegaly. 


CARDIOVASCULAR: S1 and S2 present. No murmurs, rubs, or gallops. 


PULMONARY: Chest is clear to auscultation, no wheezing , no crackles. 


ABDOMEN: Soft, nontender, nondistended, normoactive bowel sounds. No palpable 

organomegaly. 


MUSCULOSKELETAL: No joint swelling or deformity. 


EXTREMITIES: No cyanosis, clubbing, or pedal edema. 


NEUROLOGICAL: Gross neurological examination did not reveal any focal deficits. 


SKIN: No rashes. no petechiae.








- Labs


CBC & Chem 7: 


                                 11/28/24 12:23





                                 11/30/24 04:40





Assessment and Plan


Assessment: 





Severe acute on chronic back pain radiating to the right leg 


streaks of blood in his stool.  Rule out acute GI bleed


History of lumbar spondylolisthesis as per patient


Hypertension, medication induced secondary to steroids





Plan: 





C patient on steroids for his severe back pain 


Pain management


Orthopedic team consult


Start Norvasc and hydralazine as needed


Check occult blood in stool


Check iron study


Consult surgery team


Labs and medication were reviewed..  Continue same treatment.  Continue with 

symptomatic treatment.  Resume home medication.  Monitor labs and vitals.  DVT 

and GI prophylaxis.  Further recommendations as per clinical course of the 

patient


DVT prophylaxis: Subcutaneous heparin, held for possible GI bleed


GI Prophylaxis: Ppi, increased frequency to twice daily


PT/OT: Pending, deferred


Prognosis is guarded


Plan discussed with patient and he agrees

## 2024-12-02 LAB
BASOPHILS # BLD AUTO: 0 K/UL (ref 0–0.2)
BASOPHILS NFR BLD AUTO: 0 %
EOSINOPHIL # BLD AUTO: 0 K/UL (ref 0–0.7)
EOSINOPHIL NFR BLD AUTO: 0 %
ERYTHROCYTE [DISTWIDTH] IN BLOOD BY AUTOMATED COUNT: 5.1 M/UL (ref 4.3–5.9)
ERYTHROCYTE [DISTWIDTH] IN BLOOD: 12.6 % (ref 11.5–15.5)
FERRITIN SERPL-MCNC: 389 NG/ML (ref 22–322)
HCT VFR BLD AUTO: 45.2 % (ref 39–53)
HGB BLD-MCNC: 15.4 GM/DL (ref 13–17.5)
IRON SERPL-MCNC: 202 UG/DL (ref 65–175)
LYMPHOCYTES # SPEC AUTO: 0.9 K/UL (ref 1–4.8)
LYMPHOCYTES NFR SPEC AUTO: 6 %
MCH RBC QN AUTO: 30.3 PG (ref 25–35)
MCHC RBC AUTO-ENTMCNC: 34.2 G/DL (ref 31–37)
MCV RBC AUTO: 88.5 FL (ref 80–100)
MONOCYTES # BLD AUTO: 0.7 K/UL (ref 0–1)
MONOCYTES NFR BLD AUTO: 5 %
NEUTROPHILS # BLD AUTO: 12 K/UL (ref 1.3–7.7)
NEUTROPHILS NFR BLD AUTO: 88 %
PLATELET # BLD AUTO: 301 K/UL (ref 150–450)
TIBC SERPL-MCNC: 241 UG/DL (ref 228–460)
WBC # BLD AUTO: 13.7 K/UL (ref 3.8–10.6)

## 2024-12-02 RX ADMIN — HYDROCODONE BITARTRATE AND ACETAMINOPHEN PRN EACH: 5; 325 TABLET ORAL at 16:10

## 2024-12-02 RX ADMIN — HYDROMORPHONE HYDROCHLORIDE PRN MG: 1 INJECTION, SOLUTION INTRAMUSCULAR; INTRAVENOUS; SUBCUTANEOUS at 08:28

## 2024-12-02 NOTE — P.GSCN
History of Present Illness


Consult date: 12/02/24


History of present illness: 





CHIEF COMPLAINT: Back pain





HISTORY OF PRESENT ILLNESS: This is a 64-year-old male who presented to the 

hospital with complaints of back pain radiating down the right leg.  Patient is 

being followed by spinal service and is scheduled to be evaluated by pain 

service today for possible steroid injection.  During his hospitalization 

patient had complained of constipation.  Was receiving stool softeners and then 

had multiple episodes of diarrhea on Saturday.  Patient had been having bloody 

diarrhea on Saturday.  He has had no further bloody stools on Sunday or today.  

Denies any abdominal pain.  Denies any nausea or vomiting.  Hemoglobin is 

remained stable at 15.  Stool for occult blood was positive.  Last colonoscopy 

was in 2016 reporting diverticulosis.  Patient denies being on any blood 

thinners.





PAST MEDICAL HISTORY: 


See below





PAST SURGICAL HISTORY: 


See below





MEDICATIONS: 


See below





ALLERGIES: 


See below





SOCIAL HISTORY: No illicit drug use.  





REVIEW OF SYSTEMS: 


CONSTITUTIONAL: Denies fever or chills.


HEENT: Denies blurred vision, vision changes, or eye pain. Denies hemoptysis 


CARDIOVASCULAR: Denies chest pain or pressure.


RESPIRATORY: No shortness of breath. 


GASTROINTESTINAL: See HPI for pertinent findings


HEMATOLOGIC: Denies bleeding disorders.


GENITOURINARY:  Denies any blood in urine or increased urinary frequency.  


SKIN: Denies pruitis. Denies rash.





PHYSICAL EXAM: 


VITAL SIGNS: Reviewed


GENERAL: Well-developed in no acute distress. 


HEENT:  No sclera icterus. Extraocular movements grossly intact.  Moist buccal 

mucosa. Head is atraumatic, normocephalic. No nasal drainage.


ABDOMEN:  Soft.  Nondistended.  Nontender


NEUROLOGIC:  Alert and oriented.  Cranial nerves II through XII grossly intact.





LABORATORY DATA:


WBC 13.7 Hgb 15.4 platelets 301


Stool for occult blood positive





IMAGING:





ASSESSMENT: 


1.  Bloody diarrhea that has now resolved.  Hemoglobin stable.


2.  Chronic back pain


3.  History of diverticulosis


4.  Constipation





PLAN:


-No plans for colonoscopy at this time.  Patient's bleeding has resolved and 

hemoglobin remained stable


-Continue regular diet


-Recommend colonoscopy outpatient


-Back pain per spinal service and pain management














Physician Assistant note has been reviewed by physician. Signing provider agrees

with the documented findings, assessment, and plan of care.





Past Medical History


Additional Past Medical History / Comment(s): PSORIATIC ARTHRITIS, HX 

ENCEPHALITIS AS CHILD


History of Any Multi-Drug Resistant Organisms: None Reported


Past Surgical History: Appendectomy, Hernia Repair, Orthopedic Surgery


Additional Past Surgical History / Comment(s): LEFT WRIST CYST REMOVED, RT THUMB

SX, "GLAND" REMOVED FROM NECK , left knee


Past Anesthesia/Blood Transfusion Reactions: No Reported Reaction


Past Psychological History: No Psychological Hx Reported


Smoking Status: Never smoker


Past Alcohol Use History: Occasional


Past Drug Use History: Marijuana


Additional Drug Use History / Comment(s): OCCASIONAL USE, INSTRUCTED TO WITHOLD 

24 HRS PRIOR TO PROCEDURE states last marijuana was yesterday





- Past Family History


  ** Mother


Family Medical History: No Reported History





Medications and Allergies


                                Home Medications











 Medication  Instructions  Recorded  Confirmed  Type


 


Etanercept [Enbrel] 50 mg SQ SA 09/20/16 11/28/24 History


 


Cyclobenzaprine [Flexeril] 10 mg PO TID PRN #15 tab 11/10/24 11/28/24 Rx


 


Famotidine [Pepcid] 20 mg PO BID 11/28/24 11/28/24 History


 


HYDROcodone/APAP 7.5-325MG [Norco 1 tab PO Q6H PRN 11/28/24 11/28/24 History





7.5-325]    


 


predniSONE [Deltasone] See Taper PO AS DIRECTED 11/28/24 11/28/24 History








                                    Allergies











Allergy/AdvReac Type Severity Reaction Status Date / Time


 


Penicillins Allergy  Rash/Hives Verified 11/28/24 11:28














Surgical - Exam


                                   Vital Signs











Temp Pulse Resp BP Pulse Ox


 


 97.7 F   111 H  20   164/108   99 


 


 11/28/24 09:07  11/28/24 09:07  11/28/24 09:07  11/28/24 09:07  11/28/24 09:07














Results





- Labs





                                 12/02/24 05:17





                                 11/30/24 04:40


                  Abnormal Lab Results - Last 24 Hours (Table)











  12/01/24 12/02/24 12/02/24 Range/Units





  12:05 05:17 05:17 


 


WBC  14.8 H   13.7 H  (3.8-10.6)  k/uL


 


Neutrophils #  12.9 H   12.0 H  (1.3-7.7)  k/uL


 


Lymphocytes #  0.7 L   0.9 L  (1.0-4.8)  k/uL


 


Monocytes #  1.1 H    (0-1.0)  k/uL


 


Iron   202 H   ()  UG/DL


 


% Saturation   83.82 H   (15.00-50.00)   


 


Transferrin   172.0 L   (204.0-354.0)  mg/dL


 


Ferritin   389.0 H   (22.0-322.0)  ng/mL

## 2024-12-02 NOTE — P.PN
Progress Note - Text


Progress Note Date: 12/02/24


Patient is seen and examined at bedside.  He is able to move himself to the 

bathroom but unable to stand up for more than a minute or 2 at a time.  He is 

still having feelings for diarrhea.


He has been seen by medicine service and pain service.








His white count is slightly elevated likely due to his steroids.


He has remained afebrile


His lower extremities have sustained dorsiflexion plantarflexion EHL intact








Acute on chronic low back pain


Spondylolisthesis L4-5 L5-S1


Lower extreme radiculopathy


Diarrhea and bloody stools








The patient has been seen by surgery service as well as pain management.  We 

have been awaiting possibility of epidural steroid injections which is scheduled

for tomorrow.  Hopefully this can allow some relief of his symptoms so that he 

can mobilize and potentially be at home to see if he continues to make 

improvement or if he will need to present back for potential of surgical 

intervention at his lumbar spine.





I have printed and placed MRI images of the patient's lumbar spine from earlier 

this month in the patient's chart.  There are printed images of the MRI inside 

the patient's chart that can be utilized for pain management procedures.  The 

wife also has a disc of the imaging.





The patient has recurrent diarrhea with some bloody stools.  He is being managed

by medicine.  He is on a clear liquid diet in case they are considering the 

possibility of scope.

## 2024-12-02 NOTE — P.PN
Subjective


Progress Note Date: 12/02/24











This is a pleasant 64 years old male who presents because of worsening back 

pain, wife at bedside


Patient states that he has back pain for 25 years.  Usually treated with topical

agents and follow-up with chiropractor


Since beginning of November about 1 month ago his pain was starting getting more

severe and was shooting sharp pain radiating to his right leg down to the knee. 

It was gradually getting worse.


He saw his orthopedic Dr. Browning in the outpatient on Monday who prescribed him 

Norco and prednisone 20 mg.


Today patient was taking a shower on his knees and hands because he cannot sit 

while he was trying to stand up his pain was more severe and sharp so he decided

to come to the emergency room


Patient is in mild to moderate distress because of his pain, stating currently 

7-8/10 in severity.


He denies weakness in his legs.  He denies losing control of his bowel or urine.

 He denies numbness in the saddle area but sometimes he got limb numbness, he 

thinks he has little numbness in the small area on his right thigh.


He denies smoking alcohol or illicit drugs


No chest pain or dyspnea.  No other GI/ symptoms.


Vitals are stable and he is afebrile.  Urine analysis showing no signs of 

infection


Currently there is no labs or imaging done





11/29


Patient still with severe low back pain as of yesterday


Despite his being on dexamethasone steroid and Dilaudid and Flexeril


His pain is worse when he tried to go to the restroom.


No other new complaints.








11/30


Patient back pain is slightly improving.  While continued on IV steroids


Orthopedic team evaluated the patient and recommended pain management service 

which is not available till Monday.  Patient agrees to stay


Patient have somewhat high blood pressure secondary to steroid effect.  Added 

Norvasc and hydralazine as needed








12/1


Patient was seen walking in his room.


Also reports some of blood in his stool which was witnessed by staff, it was 

small streaks.  Patient denies dizziness or chest pain or dyspnea.


Will do some anemia workup like occult blood in stool.  Increase Protonix to 

twice daily, discontinue subcutaneous heparin.


we will consult surgery team for his gi bleed as there is no gi coverage in this

 facility this weekend 





Review of systems


CONSTITUTIONAL: No fever, no malaise, no fatigue. 


HEENT: No recent visual problems or hearing problems. Denied any sore throat. 


CARDIOVASCULAR: No  orthopnea, PND, no palpitations, no syncope. 


PULMONARY: No shortness of breath, no cough, no hemoptysis. 


GASTROINTESTINAL: No diarrhea, no nausea, no vomiting, no abdominal pain. 

Normoactive bowel sounds. 


NEUROLOGICAL: No headaches, no weakness, no numbness. 








12/2/2024





Patient is seen and evaluated in follow-up today reports was seeing some bright 

red blood in the stool and general surgery was consulted and placed patient on 

clear liquids with concerns of possible GI bleed.  Patient reports he was 

constipated prior to hospitalization and now is having multiple loose stools 

although no further blood noted.  Hemoglobin is stable at 15.4 and again no 

further bleeding noted.  Stool occult was positive.  General surgery discussing 

possible need for EGD/colonoscopy.  Patient did have a colonoscopy a few years 

ago and surgery has requested the results.  Patient is reporting continued pain 

and evaluated by pain management scheduled to undergo epidural injection on 

12/3/2024.  Orthopedics following with no plans for immediate surgical 

intervention at this time.  Will continue current regimen and monitor closely.  

Await PT/OT therapy evaluation





Review of systems


CONSTITUTIONAL: No fever, no malaise, no fatigue. 


HEENT: No recent visual problems or hearing problems. Denied any sore throat. 


CARDIOVASCULAR: No  orthopnea, PND, no palpitations, no syncope. 


PULMONARY: No shortness of breath, no cough, no hemoptysis. 


GASTROINTESTINAL: Reported episodes of diarrhea, no nausea, no vomiting, no 

abdominal pain. Normoactive bowel sounds.  Reports no further blood noted in the

stool


NEUROLOGICAL: No headaches, reports of generalized weakness and continued lower 

back pain








Physical exam:





Gen: This is a 64-year-old male who is awake, alert and oriented x 3, thin 

built, elderly appearing


HEENT: Head is atraumatic, normocephalic. Pupils equal, round. Sclerae is 

anicteric. 


NECK: Supple. No JVD. No lymphadenopathy. No thyromegaly. 


LUNGS: Diminished breath sounds bilaterally otherwise clear to auscultation. No 

wheezes or rhonchi.  No intercostal retractions.


HEART: S1, S2 are muffled


ABDOMEN: Soft.  Thin, nontender on palpation bowel sounds are present. No 

masses.  No tenderness.


EXTREMITIES: No pedal edema.  No calf tenderness.  Twitching and spasms noted of

lower extremities bilaterally on exam


NEUROLOGICAL: Patient is awake, alert and oriented x3. Cranial nerves 2 through 

12 are grossly intact.  Diffusely weak





Assessment: 





Severe acute on chronic back pain radiating to the right leg 


streaks of blood in his stool.  Rule out acute GI bleed


History of lumbar spondylolisthesis as per patient


Hypertension, medication induced secondary to steroids


History of psoriatic arthritis


Occasional THC use


GI prophylaxis


DVT prophylaxis


Full code





Plan: 





Patient is continued on steroids for his severe back pain with orthopedics as 

well as pain management following.  Patient scheduled for epidural injection on 

12/3/2024 and will be n.p.o. at night


General Surgery consulted with concerns of seeing bright red blood in the stool,

awaiting further evaluation and has been placed on clear liquids


Awaiting PT/OT therapy evaluation.  Will discuss with other consultations 

regarding discharge planning after epidural injection


Overall prognosis is guarded at this time.





The impression and plan of care has been dictated by Gayla Garcia, Nurse 

Practitioner as directed.





Dr. Quan MD


I have performed a history and examination and MDM of this patient, discussed 

the same with the dictator, and  agree with the dictator's assessment and plan 

as written ,documented as a scribe. Based on total visit time,  I have performed

more than 50% of the visit.








Objective





- Vital Signs


Vital signs: 


                                   Vital Signs











Temp  97.9 F   12/02/24 07:00


 


Pulse  91   12/02/24 07:00


 


Resp  16   12/02/24 07:00


 


BP  149/92   12/02/24 07:00


 


Pulse Ox  94 L  12/02/24 07:00


 


FiO2      








                                 Intake & Output











 12/01/24 12/02/24 12/02/24





 18:59 06:59 18:59


 


Intake Total 540  


 


Balance 540  


 


Intake:   


 


  Oral 540  


 


Other:   


 


  Voiding Method  Toilet 


 


  # Voids 5 2 


 


  # Bowel Movements 1  














- Labs


CBC & Chem 7: 


                                 12/02/24 05:17





                                 11/30/24 04:40


Labs: 


                  Abnormal Lab Results - Last 24 Hours (Table)











  12/01/24 12/02/24 12/02/24 Range/Units





  12:05 05:17 05:17 


 


WBC  14.8 H   13.7 H  (3.8-10.6)  k/uL


 


Neutrophils #  12.9 H   12.0 H  (1.3-7.7)  k/uL


 


Lymphocytes #  0.7 L   0.9 L  (1.0-4.8)  k/uL


 


Monocytes #  1.1 H    (0-1.0)  k/uL


 


Iron   202 H   ()  UG/DL


 


% Saturation   83.82 H   (15.00-50.00)   


 


Transferrin   172.0 L   (204.0-354.0)  mg/dL


 


Ferritin   389.0 H   (22.0-322.0)  ng/mL

## 2024-12-03 LAB
ANION GAP SERPL CALC-SCNC: 13 MMOL/L (ref 4–12)
BASOPHILS # BLD AUTO: 0.02 X 10*3/UL (ref 0–0.1)
BASOPHILS NFR BLD AUTO: 0.2 %
BUN SERPL-SCNC: 23.1 MG/DL (ref 9–27)
BUN/CREAT SERPL: 28.88 RATIO (ref 12–20)
CALCIUM SPEC-MCNC: 9.3 MG/DL (ref 8.7–10.3)
CHLORIDE SERPL-SCNC: 99 MMOL/L (ref 96–109)
CO2 SERPL-SCNC: 21 MMOL/L (ref 21.6–31.8)
EOSINOPHIL # BLD AUTO: 0.02 X 10*3/UL (ref 0.04–0.35)
EOSINOPHIL NFR BLD AUTO: 0.2 %
ERYTHROCYTE [DISTWIDTH] IN BLOOD BY AUTOMATED COUNT: 5.21 X 10*6/UL (ref 4.4–5.6)
ERYTHROCYTE [DISTWIDTH] IN BLOOD: 12.3 % (ref 11.5–14.5)
GLUCOSE SERPL-MCNC: 132 MG/DL (ref 70–110)
HCT VFR BLD AUTO: 45.1 % (ref 39.6–50)
HGB BLD-MCNC: 16.1 G/DL (ref 13–17)
IMM GRANULOCYTES BLD QL AUTO: 0.8 %
LYMPHOCYTES # SPEC AUTO: 1.42 X 10*3/UL (ref 0.9–5)
LYMPHOCYTES NFR SPEC AUTO: 11.9 %
MCH RBC QN AUTO: 30.9 PG (ref 27–32)
MCHC RBC AUTO-ENTMCNC: 35.7 G/DL (ref 32–37)
MCV RBC AUTO: 86.6 FL (ref 80–97)
MONOCYTES # BLD AUTO: 1.4 X 10*3/UL (ref 0.2–1)
MONOCYTES NFR BLD AUTO: 11.7 %
NEUTROPHILS # BLD AUTO: 8.97 X 10*3/UL (ref 1.8–7.7)
NEUTROPHILS NFR BLD AUTO: 75.2 %
NRBC BLD AUTO-RTO: 0 X 10*3/UL (ref 0–0.01)
PLATELET # BLD AUTO: 261 X 10*3/UL (ref 140–440)
POTASSIUM SERPL-SCNC: 4.4 MMOL/L (ref 3.5–5.5)
SODIUM SERPL-SCNC: 133 MMOL/L (ref 135–145)
WBC # BLD AUTO: 11.93 X 10*3/UL (ref 4.5–10)

## 2024-12-03 PROCEDURE — B01B1ZZ FLUOROSCOPY OF SPINAL CORD USING LOW OSMOLAR CONTRAST: ICD-10-PCS

## 2024-12-03 PROCEDURE — 3E0R33Z INTRODUCTION OF ANTI-INFLAMMATORY INTO SPINAL CANAL, PERCUTANEOUS APPROACH: ICD-10-PCS

## 2024-12-03 NOTE — FL
Intraoperative/procedural fluoroscopic services were provided for lumbar epidural steroid injection. 
Total fluoroscopy time is 1.7 seconds with a total of 2 submitted images to PACS. Total DAP [id number] m
Gym2.  Please see the operative note for further details.

 

[facility], Workstation: [id number], 12/3/2024 3:01 PM

## 2024-12-03 NOTE — P.PN
Subjective


Progress Note Date: 12/03/24





SURGICAL PROGRESS NOTE





CHIEF COMPLAINT: Back pain





HISTORY OF PRESENT ILLNESS: Surgical service is following in regards to 

patient's GI bleed.  Patient's bloody diarrhea has resolved.  He had a soft 

brown stool today.  Denies any abdominal pain.  Hemoglobin has gone up from 

15.4-16.  Patient is scheduled for procedure with pain medicine service for his 

back pain.  Afebrile.  Mildly tachycardic.





PHYSICAL EXAM: 


VITAL SIGNS: Reviewed.


GENERAL: Well-developed in no acute distress. 


HEENT:  No sclera icterus. Extraocular movements grossly intact.  Moist buccal 

mucosa. Head is atraumatic, normocephalic. 


ABDOMEN:  Soft.  Nondistended. Nontender. 


NEUROLOGIC: Alert and oriented. Cranial nerves II through XII grossly intact.





ASSESSMENT: 


1.  GI bleed likely secondary to bleeding hemorrhoids.  Now resolved.


2.  Chronic back pain


3.  Constipation





PLAN: 


-Recommend outpatient colonoscopy


-Recommend MiraLAX and to titrate as needed for soft bowel movements


-Recommend Benefiber over-the-counter to help with his constipation


-Patient can be discharge from surgical standpoint





Physician Assistant note has been reviewed by physician. Signing provider agrees

with the documented findings, assessment, and plan of care.





Objective





- Vital Signs


Vital signs: 


                                   Vital Signs











Temp  98.1 F   12/03/24 07:20


 


Pulse  79   12/03/24 07:20


 


Resp  15   12/03/24 07:20


 


BP  157/95   12/03/24 07:20


 


Pulse Ox  96   12/03/24 07:20


 


FiO2      








                                 Intake & Output











 12/02/24 12/03/24 12/03/24





 18:59 06:59 18:59


 


Other:   


 


  # Voids 3 2 


 


  # Bowel Movements 1  














- Labs


CBC & Chem 7: 


                                 12/03/24 04:49





                                 12/03/24 04:49


Labs: 


                  Abnormal Lab Results - Last 24 Hours (Table)











  12/03/24 12/03/24 Range/Units





  04:49 04:49 


 


WBC  11.93 H   (4.50-10.00)  X 10*3/uL


 


Immature Gran #  0.10 H   (0.00-0.04)  X 10*3/uL


 


Neutrophils #  8.97 H   (1.80-7.70)  X 10*3/uL


 


Monocytes #  1.40 H   (0.20-1.00)  X 10*3/uL


 


Eosinophils #  0.02 L   (0.04-0.35)  X 10*3/uL


 


Sodium   133 L  (135-145)  mmol/L


 


Carbon Dioxide   21.0 L  (21.6-31.8)  mmol/L


 


Anion Gap   13.00 H  (4.00-12.00)  mmol/L


 


BUN/Creatinine Ratio   28.88 H  (12.00-20.00)  Ratio


 


Glucose   132 H  ()  mg/dL

## 2024-12-03 NOTE — P.PN
Subjective


Progress Note Date: 12/03/24











This is a pleasant 64 years old male who presents because of worsening back 

pain, wife at bedside


Patient states that he has back pain for 25 years.  Usually treated with topical

agents and follow-up with chiropractor


Since beginning of November about 1 month ago his pain was starting getting more

severe and was shooting sharp pain radiating to his right leg down to the knee. 

It was gradually getting worse.


He saw his orthopedic Dr. Browning in the outpatient on Monday who prescribed him 

Norco and prednisone 20 mg.


Today patient was taking a shower on his knees and hands because he cannot sit 

while he was trying to stand up his pain was more severe and sharp so he decided

to come to the emergency room


Patient is in mild to moderate distress because of his pain, stating currently 

7-8/10 in severity.


He denies weakness in his legs.  He denies losing control of his bowel or urine.

 He denies numbness in the saddle area but sometimes he got limb numbness, he 

thinks he has little numbness in the small area on his right thigh.


He denies smoking alcohol or illicit drugs


No chest pain or dyspnea.  No other GI/ symptoms.


Vitals are stable and he is afebrile.  Urine analysis showing no signs of 

infection


Currently there is no labs or imaging done





11/29


Patient still with severe low back pain as of yesterday


Despite his being on dexamethasone steroid and Dilaudid and Flexeril


His pain is worse when he tried to go to the restroom.


No other new complaints.








11/30


Patient back pain is slightly improving.  While continued on IV steroids


Orthopedic team evaluated the patient and recommended pain management service 

which is not available till Monday.  Patient agrees to stay


Patient have somewhat high blood pressure secondary to steroid effect.  Added 

Norvasc and hydralazine as needed








12/1


Patient was seen walking in his room.


Also reports some of blood in his stool which was witnessed by staff, it was 

small streaks.  Patient denies dizziness or chest pain or dyspnea.


Will do some anemia workup like occult blood in stool.  Increase Protonix to 

twice daily, discontinue subcutaneous heparin.


we will consult surgery team for his gi bleed as there is no gi coverage in this

 facility this weekend 





Review of systems


CONSTITUTIONAL: No fever, no malaise, no fatigue. 


HEENT: No recent visual problems or hearing problems. Denied any sore throat. 


CARDIOVASCULAR: No  orthopnea, PND, no palpitations, no syncope. 


PULMONARY: No shortness of breath, no cough, no hemoptysis. 


GASTROINTESTINAL: No diarrhea, no nausea, no vomiting, no abdominal pain. 

Normoactive bowel sounds. 


NEUROLOGICAL: No headaches, no weakness, no numbness. 








12/2/2024





Patient is seen and evaluated in follow-up today reports was seeing some bright 

red blood in the stool and general surgery was consulted and placed patient on 

clear liquids with concerns of possible GI bleed.  Patient reports he was 

constipated prior to hospitalization and now is having multiple loose stools 

although no further blood noted.  Hemoglobin is stable at 15.4 and again no 

further bleeding noted.  Stool occult was positive.  General surgery discussing 

possible need for EGD/colonoscopy.  Patient did have a colonoscopy a few years 

ago and surgery has requested the results.  Patient is reporting continued pain 

and evaluated by pain management scheduled to undergo epidural injection on 

12/3/2024.  Orthopedics following with no plans for immediate surgical 

intervention at this time.  Will continue current regimen and monitor closely.  

Await PT/OT therapy evaluation





12/3/2024


Patient is seen in follow-up this morning currently n.p.o. as patient is 

scheduled to undergo epidural injection with pain management today.  Patient was

evaluated by general surgery no further bleeding noted and hemoglobin is stable 

above 16 likely suggestive of possible hemorrhoids given patient was constipated

with no further bleeding noted per patient.  Patient is afebrile denies chest 

pain or shortness of breath.  Will continue on current pain regimen and await 

evaluation by pain management.  Discussed discharge planning in 24 hours.





Review of systems


CONSTITUTIONAL: No fever, no malaise, no fatigue. 


HEENT: No recent visual problems or hearing problems. Denied any sore throat. 


CARDIOVASCULAR: No  orthopnea, PND, no palpitations, no syncope. 


PULMONARY: No shortness of breath, no cough, no hemoptysis. 


GASTROINTESTINAL: Reported episodes of diarrhea, no nausea, no vomiting, no 

abdominal pain. Normoactive bowel sounds.  Reports no further blood noted in the

stool


NEUROLOGICAL: No headaches, reports of generalized weakness and continued lower 

back pain








Physical exam:





Gen: This is a 64-year-old male who is awake, alert and oriented x 3, thin 

built, elderly appearing


HEENT: Head is atraumatic, normocephalic. Pupils equal, round. Sclerae is 

anicteric. 


NECK: Supple. No JVD. No lymphadenopathy. No thyromegaly. 


LUNGS: Diminished breath sounds bilaterally otherwise clear to auscultation. No 

wheezes or rhonchi.  No intercostal retractions.


HEART: S1, S2 are muffled


ABDOMEN: Soft.  Thin, nontender on palpation bowel sounds are present. No 

masses.  No tenderness.


EXTREMITIES: No pedal edema.  No calf tenderness.  Twitching and spasms noted of

lower extremities bilaterally on exam


NEUROLOGICAL: Patient is awake, alert and oriented x3. Cranial nerves 2 through 

12 are grossly intact.  Diffusely weak





Assessment: 





Severe acute on chronic back pain radiating to the right leg 


streaks of blood in his stool.  Ruled out acute GI bleed, likely exacerbated by 

constipation and hemorrhoids.  Will plan for outpatient colonoscopy per surgery


History of lumbar spondylolisthesis as per patient


Hypertension, medication induced secondary to steroids


History of psoriatic arthritis


Occasional THC use


GI prophylaxis


DVT prophylaxis


Full code





Plan: 





Patient is continued on steroids for his severe back pain with orthopedics as 

well as pain management following.  Patient scheduled for epidural injection 

today with pain management and will await official report


General Surgery following as there were concerns of seeing bright red blood in 

the stool, hemoglobin remained stable and no further bleeding noted.  Bleeding 

per rectum likely exacerbated by hemorrhoids as patient was constipated and then

placed on stool softeners.  Surgery will see the patient in outpatient setting 

and schedule colonoscopy at that point


Will await pain management evaluation.  Will discuss with other consultations 

regarding discharge planning after epidural injection


Overall prognosis is guarded at this time.  Probable discharge in 24 hours








The impression and plan of care has been dictated by Gayla Garcia, Nurse 

Practitioner as directed.





Dr. Quan MD


I have performed a history and examination and MDM of this patient, discussed 

the same with the dictator, and  agree with the dictator's assessment and plan 

as written ,documented as a scribe. Based on total visit time,  I have performed

more than 50% of the visit.








Objective





- Vital Signs


Vital signs: 


                                   Vital Signs











Temp  97.7 F   12/03/24 13:28


 


Pulse  94   12/03/24 15:10


 


Resp  18   12/03/24 13:28


 


BP  144/96   12/03/24 15:10


 


Pulse Ox  97   12/03/24 15:10


 


FiO2      








                                 Intake & Output











 12/03/24 12/03/24 12/04/24





 06:59 18:59 06:59


 


Other:   


 


  # Voids 2 3 














- Labs


CBC & Chem 7: 


                                 12/03/24 04:49





                                 12/03/24 04:49


Labs: 


                  Abnormal Lab Results - Last 24 Hours (Table)











  12/03/24 12/03/24 Range/Units





  04:49 04:49 


 


WBC  11.93 H   (4.50-10.00)  X 10*3/uL


 


Immature Gran #  0.10 H   (0.00-0.04)  X 10*3/uL


 


Neutrophils #  8.97 H   (1.80-7.70)  X 10*3/uL


 


Monocytes #  1.40 H   (0.20-1.00)  X 10*3/uL


 


Eosinophils #  0.02 L   (0.04-0.35)  X 10*3/uL


 


Sodium   133 L  (135-145)  mmol/L


 


Carbon Dioxide   21.0 L  (21.6-31.8)  mmol/L


 


Anion Gap   13.00 H  (4.00-12.00)  mmol/L


 


BUN/Creatinine Ratio   28.88 H  (12.00-20.00)  Ratio


 


Glucose   132 H  ()  mg/dL

## 2024-12-03 NOTE — P.PN
Progress Note - Text


Progress Note Date: 12/03/24





Patient is seen and examined at bedside.  His wife is next to him.


He does not have new complaints.  He says his pain is somewhat better controlled

when he is laying still but he still has great difficulty when he tries to stand

more than a minute or 2.  He is able to get to the bathroom and back in bed.  He

is voiding freely.


General surgery has seen him and I appreciate their input.





He has been afebrile with stable vital signs


Abdomen soft nontender


His lower extremities have sustained dorsiflexion plantarflexion EHL


His back is significant pain when he tries to mobilize and ambulate.








Acute on chronic low back pain


Spondylolisthesis L4-5 L5-S1


Severe difficulty with mobilization and ambulation


Bloody stools











The patient is scheduled for epidural steroid injection today.  I think that is 

appropriate.  He may have some relief in terms of his back pain and allow him to

mobilize better.  It typically may take several days or even couple of weeks to 

see if the injection gives good benefit.  


It is okay from spine surgery standpoint for the patient to be discharged home 

after the epidural steroid injections completed when he is okay with medicine.  

Typically I would see him back in the office after his injections to see if he 

is making some progress in the next week or so.  If he is not making progress we

can have further discussions regarding continuing aggressive conservative 

treatment or interventional pain management versus the possibility of surgical 

intervention with decompression and fusion.


I again discussed this with the patient and his wife and they understand.

## 2024-12-03 NOTE — P.PCN
Date of Procedure: 12/03/24


Procedure(s) Performed: 


 


PREOPERATIVE DIAGNOSIS: 


1- Lumbar Degenerative Disc Diseases


2-Lumbar spondylosis with  Facet arthropathy without myelopathy.


3-lumbar radiculopathy


POSTOPERATIVE DIAGNOSIS: 


1-lumbar degenerative disc disease.


2-lumbar spondylosis with  facet arthropathy without myelopathy.


3-lumbar radiculopathy


PROCEDURE


1. Lumbar epidural steroid injection under fluoroscopic guidance at the L5-S1 

level.    (Fluoroscopy imaging was available in radiology department)


2. Lumbar epidurogram. 


ANESTHESIA: Lidocaine 1% 3 and then only.


EBL: Minimal


PROCEDURE INDICATION: The patient with low back pain and radiculitis symptoms 

unresponsive to conservative treatment. Fluoroscopy was used to optimize 

visualization of the needle placement and to maximize safety. 


PROCEDURE DESCRIPTION / TECHNIQUE: 


  The patient was seen and identified in the preoperative area. Risks, benefits,

complications including but not limited to infections ,bleeding ,allergic 

reaction to the medications ,nerve damage and not complete pain releife , and 

alternatives were discussed with the patient. The patient agreed to proceed with

the procedure and signed the consent, and vital signs were stable.


  Patient was taken to the OR and time out was completed. The patient was placed

 in the prone position on procedure table and a pillow was placed under the 

abdomen to reduce lumbar lordosis. The  lumbosacral area was prepped  and draped

in the usual sterile fashion.ere closely monitored during the procedure.  Vital 

signs was monitered during the entire procedure.


Using anterior-posterior fluoroscopy, the L5-S1 interlaminar space was 

identified and the skin over this site was marked and then infiltrated with 1% 

lidocaine subcutaneously. Subsequently, a 20-gauge Tuohy epidural needle was 

inserted and advanced toward the epidural space using the ``Loss of resistance 

technique and guided by AP and lateral fluoroscopy. The correct needle position 

in the epidural space was verified with the injection of 2 mL of the water 

soluble contrast dye Isovue 200  contrast and observing an excellent epidurogram

with the epidural spread of the dye, after negative aspiration for blood and CSF

and in the absence of paresthesias. Again after negative aspiration, a 6  ml 

mixture containing 80 mg of Depo-medrol ( Preservetive Free ), and 2  ml of 

preservative free Normal Saline, and 2 ml of preservative free lidocaine 1% 

solution was injected and a washout of epidurogram was seen. Needle was 

withdrawn intact, skin was cleansed, and bandages were applied. 


COMPLICATIONS: None


DISPOSITION / PLANS: The patient was placed in a supine position and transferred

to the recovery area in a stable condition for observation. There was no 

evidence of lower extremity motor or sensory deficit after the procedure.  

Patient was discharged from the recovery room after meeting discharge criteria. 

Home discharge instructions were given to the patient by the staff. The patient 

was reexamined prior to discharge. The patient will schedule a follow up in the 

clinic in 2-4 weeks.

## 2024-12-04 VITALS
TEMPERATURE: 98.4 F | DIASTOLIC BLOOD PRESSURE: 94 MMHG | HEART RATE: 91 BPM | RESPIRATION RATE: 14 BRPM | SYSTOLIC BLOOD PRESSURE: 154 MMHG

## 2024-12-30 ENCOUNTER — HOSPITAL ENCOUNTER (OUTPATIENT)
Dept: HOSPITAL 47 - LABPAT | Age: 64
Discharge: HOME | End: 2024-12-30
Attending: ORTHOPAEDIC SURGERY
Payer: COMMERCIAL

## 2024-12-30 DIAGNOSIS — M43.10: ICD-10-CM

## 2024-12-30 DIAGNOSIS — M48.00: Primary | ICD-10-CM

## 2024-12-30 DIAGNOSIS — Z22.322: ICD-10-CM

## 2024-12-30 LAB
ANION GAP SERPL CALC-SCNC: 10.3 MMOL/L (ref 4–12)
APTT BLD: 24.9 SEC (ref 22–30)
BASOPHILS # BLD AUTO: 0.04 X 10*3/UL (ref 0–0.1)
BASOPHILS NFR BLD AUTO: 0.7 %
BUN SERPL-SCNC: 9.9 MG/DL (ref 9–27)
BUN/CREAT SERPL: 11 RATIO (ref 12–20)
CALCIUM SPEC-MCNC: 9.9 MG/DL (ref 8.7–10.3)
CHLORIDE SERPL-SCNC: 102 MMOL/L (ref 96–109)
CO2 SERPL-SCNC: 27.7 MMOL/L (ref 21.6–31.8)
EOSINOPHIL # BLD AUTO: 0.22 X 10*3/UL (ref 0.04–0.35)
EOSINOPHIL NFR BLD AUTO: 3.7 %
ERYTHROCYTE [DISTWIDTH] IN BLOOD BY AUTOMATED COUNT: 5.21 X 10*6/UL (ref 4.4–5.6)
ERYTHROCYTE [DISTWIDTH] IN BLOOD: 13 % (ref 11.5–14.5)
GLUCOSE SERPL-MCNC: 108 MG/DL (ref 70–110)
HCT VFR BLD AUTO: 46.2 % (ref 39.6–50)
HGB BLD-MCNC: 15.3 G/DL (ref 13–17)
IMM GRANULOCYTES BLD QL AUTO: 0.5 %
INR PPP: 0.9 (ref ?–1.2)
LYMPHOCYTES # SPEC AUTO: 1.86 X 10*3/UL (ref 0.9–5)
LYMPHOCYTES NFR SPEC AUTO: 31.2 %
MCH RBC QN AUTO: 29.4 PG (ref 27–32)
MCHC RBC AUTO-ENTMCNC: 33.1 G/DL (ref 32–37)
MCV RBC AUTO: 88.7 FL (ref 80–97)
MONOCYTES # BLD AUTO: 1.02 X 10*3/UL (ref 0.2–1)
MONOCYTES NFR BLD AUTO: 17.1 %
NEUTROPHILS # BLD AUTO: 2.79 X 10*3/UL (ref 1.8–7.7)
NEUTROPHILS NFR BLD AUTO: 46.8 %
NRBC BLD AUTO-RTO: 0 X 10*3/UL (ref 0–0.01)
PH UR: 5.5 [PH]
PLATELET # BLD AUTO: 372 X 10*3/UL (ref 140–440)
POTASSIUM SERPL-SCNC: 4.8 MMOL/L (ref 3.5–5.5)
PT BLD: 10 SEC (ref 10–12.5)
SODIUM SERPL-SCNC: 140 MMOL/L (ref 135–145)
SP GR UR: 1.02 (ref 1–1.03)
UROBILINOGEN UR QL: 0.2 E.U./DL
WBC # BLD AUTO: 5.96 X 10*3/UL (ref 4.5–10)

## 2024-12-30 PROCEDURE — 36415 COLL VENOUS BLD VENIPUNCTURE: CPT

## 2024-12-30 PROCEDURE — 86901 BLOOD TYPING SEROLOGIC RH(D): CPT

## 2024-12-30 PROCEDURE — 80048 BASIC METABOLIC PNL TOTAL CA: CPT

## 2024-12-30 PROCEDURE — 85025 COMPLETE CBC W/AUTO DIFF WBC: CPT

## 2024-12-30 PROCEDURE — 71046 X-RAY EXAM CHEST 2 VIEWS: CPT

## 2024-12-30 PROCEDURE — 86900 BLOOD TYPING SEROLOGIC ABO: CPT

## 2024-12-30 PROCEDURE — 85730 THROMBOPLASTIN TIME PARTIAL: CPT

## 2024-12-30 PROCEDURE — 85610 PROTHROMBIN TIME: CPT

## 2024-12-30 PROCEDURE — 87070 CULTURE OTHR SPECIMN AEROBIC: CPT

## 2024-12-30 PROCEDURE — 86850 RBC ANTIBODY SCREEN: CPT

## 2024-12-30 PROCEDURE — 81001 URINALYSIS AUTO W/SCOPE: CPT

## 2024-12-30 NOTE — XR
EXAMINATION TYPE: XR chest 2V

 

DATE OF EXAM: 12/30/2024 11:42 AM

 

COMPARISON: Chest radiographs from 4/10/2023

 

TECHNIQUE: XR chest 2V Frontal and lateral views of the chest.

 

CLINICAL INDICATION:Male, 64 years old with history of Z01.818 ENCOUNTER FOR OTHER PREPROCEDURAL  Z22
.322; preop for spine surgery.

 

FINDINGS: 

Lungs/Pleura: There is no evidence of pleural effusion, focal consolidation, or pneumothorax.  

Pulmonary vascularity: Unremarkable.

Heart/mediastinum: Cardiomediastinal silhouette is unremarkable.

Musculoskeletal: No acute osseous pathology.

 

 

IMPRESSION: 

No acute cardiopulmonary disease/process.

 

 

[facility], Workstation: [id number], 12/30/2024 11:45 AM

## 2025-01-08 ENCOUNTER — HOSPITAL ENCOUNTER (OUTPATIENT)
Dept: HOSPITAL 47 - OR | Age: 65
Setting detail: OBSERVATION
LOS: 2 days | Discharge: HOME | End: 2025-01-10
Attending: ORTHOPAEDIC SURGERY | Admitting: ORTHOPAEDIC SURGERY
Payer: COMMERCIAL

## 2025-01-08 DIAGNOSIS — K21.9: ICD-10-CM

## 2025-01-08 DIAGNOSIS — M43.16: ICD-10-CM

## 2025-01-08 DIAGNOSIS — M47.26: ICD-10-CM

## 2025-01-08 DIAGNOSIS — M48.07: Primary | ICD-10-CM

## 2025-01-08 DIAGNOSIS — I10: ICD-10-CM

## 2025-01-08 DIAGNOSIS — M43.17: ICD-10-CM

## 2025-01-08 DIAGNOSIS — Z79.899: ICD-10-CM

## 2025-01-08 DIAGNOSIS — Z88.0: ICD-10-CM

## 2025-01-08 DIAGNOSIS — M48.061: ICD-10-CM

## 2025-01-08 DIAGNOSIS — M51.16: ICD-10-CM

## 2025-01-08 DIAGNOSIS — M47.27: ICD-10-CM

## 2025-01-08 DIAGNOSIS — M51.17: ICD-10-CM

## 2025-01-08 DIAGNOSIS — L40.50: ICD-10-CM

## 2025-01-08 PROCEDURE — 97161 PT EVAL LOW COMPLEX 20 MIN: CPT

## 2025-01-08 PROCEDURE — 20936 SP BONE AGRFT LOCAL ADD-ON: CPT

## 2025-01-08 PROCEDURE — 80048 BASIC METABOLIC PNL TOTAL CA: CPT

## 2025-01-08 PROCEDURE — 20930 SP BONE ALGRFT MORSEL ADD-ON: CPT

## 2025-01-08 PROCEDURE — 96374 THER/PROPH/DIAG INJ IV PUSH: CPT

## 2025-01-08 PROCEDURE — 85025 COMPLETE CBC W/AUTO DIFF WBC: CPT

## 2025-01-08 PROCEDURE — 63052 LAM FACETC/FRMT ARTHRD LUM 1: CPT

## 2025-01-08 PROCEDURE — 97116 GAIT TRAINING THERAPY: CPT

## 2025-01-08 PROCEDURE — 61783 SCAN PROC SPINAL: CPT

## 2025-01-08 PROCEDURE — 22633 ARTHRD CMBN 1NTRSPC LUMBAR: CPT

## 2025-01-08 PROCEDURE — 85027 COMPLETE CBC AUTOMATED: CPT

## 2025-01-08 PROCEDURE — 94760 N-INVAS EAR/PLS OXIMETRY 1: CPT

## 2025-01-08 PROCEDURE — 22634 ARTHRD CMBN 1NTRSPC EA ADDL: CPT

## 2025-01-08 PROCEDURE — 72020 X-RAY EXAM OF SPINE 1 VIEW: CPT

## 2025-01-08 PROCEDURE — 96376 TX/PRO/DX INJ SAME DRUG ADON: CPT

## 2025-01-08 PROCEDURE — 22842 INSERT SPINE FIXATION DEVICE: CPT

## 2025-01-08 PROCEDURE — 63053 LAM FACTC/FRMT ARTHRD LUM EA: CPT

## 2025-01-08 PROCEDURE — 22853 INSJ BIOMECHANICAL DEVICE: CPT

## 2025-01-08 PROCEDURE — 86891 AUTOLOGOUS BLOOD OP SALVAGE: CPT

## 2025-01-08 RX ADMIN — POTASSIUM CHLORIDE ONE MLS: 14.9 INJECTION, SOLUTION INTRAVENOUS at 13:00

## 2025-01-08 RX ADMIN — ONDANSETRON ONE MG: 2 INJECTION INTRAMUSCULAR; INTRAVENOUS at 08:52

## 2025-01-08 RX ADMIN — DEXAMETHASONE SODIUM PHOSPHATE ONE: 4 INJECTION, SOLUTION INTRAMUSCULAR; INTRAVENOUS at 16:20

## 2025-01-08 RX ADMIN — CEFAZOLIN PRN MLS: 330 INJECTION, POWDER, FOR SOLUTION INTRAMUSCULAR; INTRAVENOUS at 09:57

## 2025-01-08 RX ADMIN — THROMBIN, TOPICAL (BOVINE) ONE UNIT: KIT at 10:02

## 2025-01-08 RX ADMIN — HYALURONIDASE (HUMAN RECOMBINANT) ONE ML: 150 INJECTION, SOLUTION SUBCUTANEOUS at 10:03

## 2025-01-08 RX ADMIN — POTASSIUM CHLORIDE SCH MLS/HR: 14.9 INJECTION, SOLUTION INTRAVENOUS at 08:52

## 2025-01-08 RX ADMIN — LIDOCAINE HYDROCHLORIDE,EPINEPHRINE BITARTRATE ONE ML: 20; .01 INJECTION, SOLUTION INFILTRATION; PERINEURAL at 10:03

## 2025-01-08 RX ADMIN — CEFAZOLIN PRN MLS: 330 INJECTION, POWDER, FOR SOLUTION INTRAMUSCULAR; INTRAVENOUS at 12:46

## 2025-01-08 RX ADMIN — LIDOCAINE HYDROCHLORIDE,EPINEPHRINE BITARTRATE ONE ML: 20; .01 INJECTION, SOLUTION INFILTRATION; PERINEURAL at 10:30

## 2025-01-08 RX ADMIN — HYALURONIDASE (HUMAN RECOMBINANT) ONE ML: 150 INJECTION, SOLUTION SUBCUTANEOUS at 10:30

## 2025-01-08 RX ADMIN — POTASSIUM CHLORIDE ONE MLS: 14.9 INJECTION, SOLUTION INTRAVENOUS at 10:19

## 2025-01-08 RX ADMIN — HYDROMORPHONE HYDROCHLORIDE PRN MG: 1 INJECTION, SOLUTION INTRAMUSCULAR; INTRAVENOUS; SUBCUTANEOUS at 16:02

## 2025-01-08 RX ADMIN — CEFAZOLIN SCH: 330 INJECTION, POWDER, FOR SOLUTION INTRAMUSCULAR; INTRAVENOUS at 16:20

## 2025-01-08 RX ADMIN — HYDROCODONE BITARTRATE AND ACETAMINOPHEN PRN EACH: 10; 325 TABLET ORAL at 18:27

## 2025-01-08 RX ADMIN — POTASSIUM CHLORIDE ONE MLS: 14.9 INJECTION, SOLUTION INTRAVENOUS at 09:54

## 2025-01-08 RX ADMIN — CYCLOBENZAPRINE HYDROCHLORIDE PRN MG: 10 TABLET, FILM COATED ORAL at 19:54

## 2025-01-08 RX ADMIN — POTASSIUM CHLORIDE ONE MLS: 14.9 INJECTION, SOLUTION INTRAVENOUS at 08:31

## 2025-01-08 NOTE — P.OP
Date of Procedure: 01/08/25


Preoperative Diagnosis: 


Spinal stenosis L4-5 L5-S1, dynamic spondylolisthesis L4-5 L5-S1, severe facet 

arthritis L4-5 L5-S1, lower extreme radiculopathy, low back pain, degenerative 

disc disease, spondylolysis L5 bilaterally


Postoperative Diagnosis: 


Same


Anesthesia: GETA


Pathology: none sent


Condition: stable


Disposition: PACU


Description of Procedure: 


DESCRIPTION OF PROCEDURE(S):





BRIEF OPERATIVE NOTE





Preoperative Diagnosis: Spinal stenosis L4-5 L5-S1, dynamic spondylolisthesis 

L4-5 L5-S1, severe facet arthritis L4-5 L5-S1, lower extreme radiculopathy, low 

back pain, degenerative disc disease, spondylolysis L5 bilaterally


Postoperative Diagnosis: Same


Procedure: Laminectomy and decompression L4-5 L5-S1


                  Computer CT navigation aided Minimally invasive Posterior 

lateral decompression and facet fusion L4-5 L5-S1


                  Minimally invasive Transforaminal lumbar interbody fusion for 

a 360 fusion L4-5 L5-S1


                 Discectomy for decompression L4-5 L5-S1


                  Placement of interbody graft L4-5 L5-S1


         Use of computer navigation for fusion L4-5 L5-S1


                  Local autogenous bone grafting  


         Aspiration of bone marrow from the vertebral body pedicle L4 on the 

right


                  Use of bone graft extenders                   


Surgeon: Dr. Bell


Assistant: Moose WALTERS who is present throughout the entire the case 

persistence during positioning, dissection, exposure, visualization, and all 

crucial elements of the case as well as closure.


Anesthesia: General anesthesia per 


Estimated blood loss: Approximately 300 mL, with blood given back via Cell Saver


Complications: None apparent


Components implanted: K2M minimally invasive Everest pedicle screw system withsc

rews measuring 6.5 mm in diameter to rods peek Beardsley interbody cages 

measuring 6 mm with 10 mL of osteo amp bio4 bone graft substitute and 30 mL of 

the BX bone fibers to supplement the local autogenous bone graft and bone marrow

aspirate


Disposition: To recovery room in good stable condition.





OPERATIVE INDICATIONS





The patient has had severe issues at their lower extremity in her lower back ov

er the past several years with significant worsening over the past several 

months.  He had been hospitalized over the past couple of months couple of times

because of the severity of his pain and symptoms.  He was found to have severe 

dynamic spondylolisthesis L4-5 and L5-S1 with severe stenosis which correlated 

well with his low back and lower extremity symptoms.  Over the past few months  

the patient had  pain at their back and their lower extremities.  The patient is

having severe radicular symptoms at their lower extremity with weakness.  The 

patient is having significant pain in their back.  They are unable to obtain any

comfort.  We did aggressive conservative treatment with medications therapy and 

interventional pain management however thery were not having any relief.  The 

patient also showed evidence of a listhesis  with some dynamic instability.  The

patient has been through conservative treatment.  We discussed various treatment

options including surgery, and the patient wishes to proceed with surgery We 

discussed the risk, patient's alternatives and benefits of surgery including but

not limited to, risk of bleeding risk of infection, risk of need for further 

surgery, risk of decreased, loss of motion, muscle function, malunion nonunion, 

hardware failure, nerve damage, paralysis, heart attack, blindness and death.  

They understood issues with the current pandemic and the possibility of 

exposure.





OPERATIVE SUMMARY





After discussing all the risks, patient alternatives and benefits at length, the

patient elected to proceed with surgical intervention, signed informed consent, 

and presented for their procedure.  The patient was seen and examined in the 

preoperative holding area and the surgical site was marked.  The patient was 

given antibiotics and brought to the operating room.





The patient was sedated and intubated by anesthesia in standard fashion.  The 

patient was positioned on to the operating room table in a prone position on the

appropriate frame which was well-padded and well molded.  We were careful to pad

any bony prominences and pressure points. We were careful to maintain the 

patient's cervical spine and good neutral alignment and position throughout.





The patient was prepped and draped in a normal standard fashion.  An appropriate

timeout and keystone protocol performed.  We were able to proceed with the 

surgery.  The local wound area was infiltrated with local anesthetic. 





Over the right iliac crest I was able to make small stab incisions and establish

a guidepin screw fixation to the iliac crest 2.  I was able place the computer 

referencing device over the guidepins to establish an appropriate reference 

point for the Ziem CT navigation.  We then were able to place patient in an 

appropriate drape and do a navigation spin for visualization and 3-D 

reconstruction of the lumbar spine.  I was able utilize C-arm guidance and 

navigation to establish appropriate position over the pedicles bilaterally at 

the appropriate levels at L4-L5 and S1.  With the appropriate levels confirmed 

was able to make small  incisions over the appropriate pedicle sites 

bilaterally.  Utilizing the computer navigation device I was able to establish 

bony landmarks at the right iliac crest for a bony reference point for the 

navigation device.  I was able to establish a Jamshidi needle over the lateral 

aspect of the pedicle and advanced the trocar into the pedicle being careful not

to breech superiorly inferiorly medially or laterally using computer navigation 

device.  Position was confirmed regularly with AP and lateral images on C-arm 

and with the computer navigation device at the appropriate levels bilaterally.  

I was able to establish the trocar into the pedicle appropriately into the 

posterior aspect of the vertebral body bilaterally at the appropriate levels.  

This was done at each of the pedicle positions and each of the vertebrae at L4-

L5 and S1.  At the superior vertebrae of L4 I was able to take approximately 25 

mL of bone aspiration for use later in the case to supplement the allograft and 

autograft bone.  I was able place the guidewire into the trocar and into the 

vertebral body appropriately under C-arm guidance.  Dissection was taken down 

over the wire to the appropriate starting position for the screw placed.  The 

appropriate length screw was chosen, threaded over the guidewire and screwed 

appropriately into the pedicle and vertebral body under C-arm guidance in 

excellent alignment and position with good bony purchase.  This is done at each 

of the screw sites at the appropriate levels.  Upon completion of placement of 

the screws we spun the [email or web address] navigation device again and confirmed excellent 

alignment and position of all the screws at L4-L5 and S1





With the screws intact I extended the incision to connect the screw hole sites 

on the most symptomatic side on the right.  I dissected down to establish access

over the pars and lamina to the base of the spinous process.  I was able to 

expose the facet joint.  The capsule the facet was taken down and showed some 

facet arthrosis at the joint.  I was able to use a combination of curettes and 

Kerrison rongeurs and a high-speed drill to take down the facet joint and do a 

facetectomy.   The decompression was more difficult given the nature and 

severity of his listhesis but was able to get excellent decompression at both 

levels.  I started at L5-S1 and then moved up to L4-5 similarly.  I was able get

excellent foraminal decompression and central decompression with undermining 

across midline to perform a laminectomy centrally and contralaterally.  I was 

able get good central decompression.  The ligamentum flavum was taken down to 

further decompress centrally and at bilateral neural foramen.  I was able to 

expose the disc space and visualize the traversing nerve root.  Note was made of

some disc protrusion and disc herniation that was abutting the traversing nerve 

root at the level causing further compression of the nerve root.  I was able to 

establish a annulotomy at the appropriate level protecting soft tissue and 

neural structures.  Note was made of some disc desiccation at the disc.   I 

performed a complete discectomy with accommodation of curettes and rasps and 

scrapers.  I was able get good endplate preparation at the disc space.  I sized 

for the appropriate size interbody spacer protecting the soft tissue and neural 

structures.  The wound was copiously irrigated and suctioned dry.  There is no 

evidence of any dural tear or leak.


I was able to pack the disc space with local autogenous bone graft as well as a 

small amount of bone graft which was also placed into the interbody cage itself.

 Protecting the soft tissue structures and neural structures I was able place 

the interbody cage in good alignment and good position with good fit and fill at

the interbody space.  Position was confirmed with C-arm guidance.


Good hemostasis maintained.  There is no evidence of any dural tear or leak.  

The wound was irrigated and suctioned dry.





With the hardware intact, intraoperative C-arm imaging was again taken which 

showed good alignment and position of the hardware at the appropriate levels at 

L4-L5 and S1.  We were then able to measure, contour and place the rods and 

appropriate hardware bilaterally.  I was able to use the rods and screws to 

obtain further reduction of the listhesis and excellent distraction at the disc 

spaces to get improved realignment at L4-L5 and S1.  I was able to place 

capcrews, tighten  them down, and torque  them with the torque screwdriver 

appropriately.  With this intact I was able to place the local autogenous bone 

graft with additional bone graft enhancer as necessary into the posterior 

lateral gutters over the decorticated transverse processes and facet joints on 

the contralateral side.  The remainder of the bone graft was placed over the 

facet joint on the contralateral side after taking down the facet joint capsule.

  With the bone graft intact, a stable construct, and good decompression at the 

appropriate levels, we were able to proceed with closure.  Good hemostasis was 

maintained.  There is no evidence of dural tear or leak.  The fascia was closed 

for a watertight closure. he subcuticular tissue was closed with absorbable 

suture.  The wound was cleaned and dried and dressed with the appropriate 

dressing.  The drapes were broken down.  The patient was gently rolled back onto

their hospital bed being careful to maintain their cervical spine and good 

neutral alignment and position.  They were woken up by anesthesia, extubated, 

and brought to the recovery room in good stable condition.





The patient will be admitted to the hospital for appropriate postoperative care,

medical management and monitoring.  We will continue to follow them closely 

about the postoperative course.

## 2025-01-08 NOTE — FL
EXAMINATION TYPE: FL guidance operating room, XR lumbar spine 1V

 

DATE OF EXAM: 1/8/2025

 

CLINICAL HISTORY: Low back pain.

 

TECHNIQUE: Fluoroscopy. Intraoperative 2 views lumbar spine

 

COMPARISON:  None.

 

FINDINGS:  Fluoroscopic guidance was provided during minimally invasive lumbar spine surgical procedu
re performed by Dr. Bell.  A total of 59 seconds of fluoroscopic time was utilized during the proced
ure and 6 spot intraoperative images are acquired.

 

Intraoperative 2 view show placement of posterior intrapedicular rods and screws transfixing L4-S1 le
vels with artificial metallic disc material at these levels. Alignment is satisfactory on the intraop
erative images obtained.

 

TOTAL DAP= 810.33 cGy x cm3.

 

IMPRESSION:  As Above.

 

 

[facility], Workstation: [id number], 1/8/2025 2:35 PM

## 2025-01-09 LAB
ANION GAP SERPL CALC-SCNC: 10.7 MMOL/L (ref 4–12)
BASOPHILS # BLD AUTO: 0.03 X 10*3/UL (ref 0–0.1)
BASOPHILS NFR BLD AUTO: 0.3 %
BUN SERPL-SCNC: 9.6 MG/DL (ref 9–27)
BUN/CREAT SERPL: 16 RATIO (ref 12–20)
CALCIUM SPEC-MCNC: 9 MG/DL (ref 8.7–10.3)
CHLORIDE SERPL-SCNC: 100 MMOL/L (ref 96–109)
CO2 SERPL-SCNC: 27.3 MMOL/L (ref 21.6–31.8)
EOSINOPHIL # BLD AUTO: 0.03 X 10*3/UL (ref 0.04–0.35)
EOSINOPHIL NFR BLD AUTO: 0.3 %
ERYTHROCYTE [DISTWIDTH] IN BLOOD BY AUTOMATED COUNT: 4.22 X 10*6/UL (ref 4.4–5.6)
ERYTHROCYTE [DISTWIDTH] IN BLOOD: 13.3 % (ref 11.5–14.5)
GLUCOSE SERPL-MCNC: 127 MG/DL (ref 70–110)
HCT VFR BLD AUTO: 36.1 % (ref 39.6–50)
HGB BLD-MCNC: 12.6 G/DL (ref 13–17)
IMM GRANULOCYTES BLD QL AUTO: 0.5 %
LYMPHOCYTES # SPEC AUTO: 1.07 X 10*3/UL (ref 0.9–5)
LYMPHOCYTES NFR SPEC AUTO: 9.2 %
MCH RBC QN AUTO: 29.9 PG (ref 27–32)
MCHC RBC AUTO-ENTMCNC: 34.9 G/DL (ref 32–37)
MCV RBC AUTO: 85.5 FL (ref 80–97)
MONOCYTES # BLD AUTO: 1.35 X 10*3/UL (ref 0.2–1)
MONOCYTES NFR BLD AUTO: 11.6 %
NEUTROPHILS # BLD AUTO: 9.11 X 10*3/UL (ref 1.8–7.7)
NEUTROPHILS NFR BLD AUTO: 78.1 %
NRBC BLD AUTO-RTO: 0 X 10*3/UL (ref 0–0.01)
PLATELET # BLD AUTO: 258 X 10*3/UL (ref 140–440)
POTASSIUM SERPL-SCNC: 3.9 MMOL/L (ref 3.5–5.5)
SODIUM SERPL-SCNC: 138 MMOL/L (ref 135–145)
WBC # BLD AUTO: 11.65 X 10*3/UL (ref 4.5–10)

## 2025-01-09 RX ADMIN — DOCUSATE SODIUM AND SENNOSIDES SCH EACH: 50; 8.6 TABLET ORAL at 08:22

## 2025-01-09 RX ADMIN — PANTOPRAZOLE SODIUM SCH MG: 40 INJECTION, POWDER, FOR SOLUTION INTRAVENOUS at 14:41

## 2025-01-09 RX ADMIN — FAMOTIDINE SCH MG: 20 TABLET, FILM COATED ORAL at 08:22

## 2025-01-09 RX ADMIN — CALCIUM CARBONATE (ANTACID) CHEW TAB 500 MG PRN MG: 500 CHEW TAB at 00:35

## 2025-01-09 NOTE — P.CONS
History of Present Illness





- Reason for Consult


Consult date: 01/09/25


Medical management


Requesting physician: VERENA Bell





- Chief Complaint


status post minimally invasive decompression fusion at L4-5, L5-S1





- History of Present Illness


This is a 64-year-old gentleman with past medical history significant for 

hypertension, psoriatic arthritis, childhood encephalitis , occasional marijuana

use ,admitted with high-grade spondylolisthesis with spinal stenosis and lower 

extremity radiculopathy, status post minimally invasive decompression and fusion

at L4-5 space L5-S1, postop day #1, tolerated procedure well.  Positive pain.  

Spontaneously voiding, Peralta catheter discontinued.  Denies nausea ,vomiting.  

Denies abdominal pain.  Mild tachycardia, denies chest pain, palpitations or 

shortness of breath.  Afebrile, WBC 11.65, hemoglobin 12.6, platelets 258.  

Electrolytes and renal function within normal limits.








Review of Systems





Constitutional: Denied any fatigue denied any fever.


Cardio vascular: denied any chest pain, palpitations


Gastrointestinal denied any nausea vomiting


Pulmonary: Denied any shortness of breath cough


Neurologic denied any new focal deficits








ROS Statement: 


Those systems with pertinent positive or pertinent negative responses have been 

documented in the HPI.








ROS Other: All systems not noted in ROS Statement are negative.





Past Medical History


Past Medical History: Hypertension


Additional Past Medical History / Comment(s): PSORIATIC ARTHRITIS, HX 

ENCEPHALITIS AS CHILD ringing in ears.


History of Any Multi-Drug Resistant Organisms: None Reported


Past Surgical History: Appendectomy, Hernia Repair, Orthopedic Surgery


Additional Past Surgical History / Comment(s): LEFT WRIST CYST REMOVED, RT THUMB

SX, "GLAND" REMOVED FROM NECK , left knee arthroscopy


Past Anesthesia/Blood Transfusion Reactions: No Reported Reaction


Additional Past Anesthesia/Blood Transfusion Reaction / Comm: no blood tx hx


Smoking Status: Never smoker





- Past Family History


  ** Mother


Family Medical History: No Reported History





Medications and Allergies


                                Home Medications











 Medication  Instructions  Recorded  Confirmed  Type


 


Etanercept [Enbrel] 50 mg SQ MO 09/20/16 01/08/25 History


 


Famotidine [Pepcid] 20 mg PO DAILY 11/28/24 01/08/25 History


 


HYDROcodone/APAP 7.5-325MG [Norco 1 tab PO DAILY PRN 11/28/24 01/08/25 History





7.5-325]    


 


Acetaminophen Tab [Tylenol] 650 mg PO Q6HR PRN  tab 12/04/24 01/08/25 Rx


 


Sennosides-Docusate Sodium 1 each PO DAILY PRN #20 tab 12/04/24 01/08/25 Rx





[Senokot-S]    


 


Cyclobenzaprine [Flexeril] 10 mg PO DAILY PRN 01/03/25 01/08/25 History


 


Wheat Dextrin [Benefiber] 1 packet PO DAILY 01/03/25 01/08/25 History


 


amLODIPine [Norvasc] 10 mg PO DAILY 01/03/25 01/08/25 History








                                    Allergies











Allergy/AdvReac Type Severity Reaction Status Date / Time


 


Penicillins Allergy  Rash/Hives Verified 01/08/25 08:24














Physical Exam


Vitals: 


                                   Vital Signs











  Temp Pulse Pulse Resp BP Pulse Ox


 


 01/09/25 13:52  98.1 F  116 H   17  142/84  95


 


 01/09/25 07:41  97.9 F  117 H   18  138/98  97


 


 01/09/25 01:02  98.1 F   113 H  17  120/82  96


 


 01/08/25 17:30   55 L    149/77  94 L


 


 01/08/25 17:15   105 H    136/87  94 L


 


 01/08/25 17:00   104 H    128/86  94 L


 


 01/08/25 16:45   104 H    131/85  94 L


 


 01/08/25 16:30   100    131/87  94 L


 


 01/08/25 16:15   95    134/85  93 L


 


 01/08/25 16:05  97.4 F L  91   18  141/89  95


 


 01/08/25 16:00   95    142/87  96


 


 01/08/25 15:25   92  96  16  149/94  98


 


 01/08/25 15:23    91  16  157/103  99


 


 01/08/25 15:08    91  16  157/103  99


 


 01/08/25 14:53    88  16  154/99  99


 


 01/08/25 14:38  97.5 F L   84  16  158/100  100








                                Intake and Output











 01/08/25 01/09/25 01/09/25





 22:59 06:59 14:59


 


Output Total 650 1500 2500


 


Balance -650 -1500 -2500


 


Output:   


 


  Urine 650 1500 2500


 


    Uretheral (Peralta)  1500 1100


 


Other:   


 


  Voiding Method Indwelling Catheter  Urinal


 


  Weight 81.3 kg  











PHYSICAL EXAM:


VITAL SIGNS: [Reviewed]


GENERAL: Alert and oriented x 3, laying in bed, no acute distress


HEENT: Normocephalic, atraumatic conjunctivae normal. eyes normal. MMM.


NECK: Supple, no JVD. No LNs


CARDIOVASCULAR:  S1, S2 regular.. No murmur


RESPIRATION: Unlabored, equal air entry, clear to auscultation ,breath sounds 

diminished in the bases. 


ABDOMEN:  Soft, nondistended, nontender . No guarding. no masses palpable. +BS.


LEGS:  No edema. no swelling 


NERVOUS SYSTEM:  Cranial N 2-12 grossly normal.  Moves all 4 limbs.


Skin: Warm and dry, no rash 








Results


CBC & Chem 7: 


                                 01/09/25 05:16





                                 01/09/25 05:16


Labs: 


                  Abnormal Lab Results - Last 24 Hours (Table)











  01/09/25 01/09/25 Range/Units





  05:16 05:16 


 


WBC  11.65 H   (4.50-10.00)  X 10*3/uL


 


RBC  4.22 L   (4.40-5.60)  X 10*6/uL


 


Hgb  12.6 L   (13.0-17.0)  g/dL


 


Hct  36.1 L   (39.6-50.0)  %


 


Immature Gran #  0.06 H   (0.00-0.04)  X 10*3/uL


 


Neutrophils #  9.11 H   (1.80-7.70)  X 10*3/uL


 


Monocytes #  1.35 H   (0.20-1.00)  X 10*3/uL


 


Eosinophils #  0.03 L   (0.04-0.35)  X 10*3/uL


 


Glucose   127 H  ()  mg/dL














Assessment and Plan


Assessment: 


high-grade spondylolisthesis with spinal stenosis and lower extremity 

radiculopathy, status post minimally invasive decompression and fusion at L4-5 

space L5-S1, postop day #1


Gastroesophageal reflux disease


Hypertension


marijuana use, occasional














Plan: Continue on current medication regimen ,monitoring and symptomatic 

treatment.  Pain management, DVT prophylaxis as per orthopedic spine.aggressive 

pulmonary toileting with incentive spirometer reinforced.  IV push PPI added for

 complaints of reflux in addition to Pepcid.  Home meds have been reviewed and 

resumed accordingly.  PT OT.  Thank you for the consult.














The impression and plan of care has been dictated as directed.





:


I performed a history and examination of this patient,  discussed the same with 

the dictator.  I agree with the dictator's note ,documented as a scribe.  Any 

additional findings or plans will be noted.

## 2025-01-09 NOTE — P.PN
Progress Note - Text


Progress Note Date: 01/09/25





Postoperative day #1





Patient is seen and examined today at bedside.  The patient has some pain around

the surgical site as expected.  Pain is being controlled with medication.  He 

says his legs are doing well.  His back is sore.  He he had his Peralta out and is

has been able to void already.  He is tolerating his diet.  He denies any nausea

or vomiting.  He denies any chest pain or shortness of breath.  He feels like he

has some reflux because of eating in bed





Physical Exam





Afebrile with stable vital signs


Abdomen is soft nontender.  He has no distention.  Chest has good excursion deep

and space expiration


The incision site is clean dry and intact.  No erythema there is no purulence.


Extremities have not had neurologic change from prior to surgery.  He has 

sustained dorsiflexion plantarflexion EHL intact.


Calves and thighs were soft nontender without evidence of DVT.





Assessment/Plan





Postoperative day #1 status post minimally invasive decompression fusion at L4-5

L5-S1 for his high-grade spondylolisthesis with spinal stenosis and lower 

extreme radiculopathy





Patient is progressing as expected from the surgery.  The pain at his back is 

expected but is seems to be controlled adequately with the pain medication 

regimen.


We will continue to increase the patient's mobilization with therapy.  He will 

be up out of bed on his feet today and hopefully will be able to advance his 

motion more in the next day or 2


We will continue pain control with oral or IV medications.  


He seems to be having some reflux and is taking Tums for this.  Hopefully this 

will settle down as he is able to sit up while he eats.  He denies any chest 

pain or shortness of breath.  We'll continue to follow patient closely.

## 2025-01-10 VITALS — DIASTOLIC BLOOD PRESSURE: 82 MMHG | SYSTOLIC BLOOD PRESSURE: 163 MMHG | RESPIRATION RATE: 16 BRPM | TEMPERATURE: 98.4 F

## 2025-01-10 VITALS — HEART RATE: 94 BPM

## 2025-01-10 LAB
ERYTHROCYTE [DISTWIDTH] IN BLOOD BY AUTOMATED COUNT: 4.24 X 10*6/UL (ref 4.4–5.6)
ERYTHROCYTE [DISTWIDTH] IN BLOOD: 13.1 % (ref 11.5–14.5)
HCT VFR BLD AUTO: 38 % (ref 39.6–50)
HGB BLD-MCNC: 12.4 G/DL (ref 13–17)
MCH RBC QN AUTO: 29.2 PG (ref 27–32)
MCHC RBC AUTO-ENTMCNC: 32.6 G/DL (ref 32–37)
MCV RBC AUTO: 89.6 FL (ref 80–97)
NRBC BLD AUTO-RTO: 0 X 10*3/UL (ref 0–0.01)
PLATELET # BLD AUTO: 243 X 10*3/UL (ref 140–440)
WBC # BLD AUTO: 11.81 X 10*3/UL (ref 4.5–10)

## 2025-01-10 NOTE — P.PN
Subjective


Progress Note Date: 01/10/25


- Reason for Consult


Consult date: 01/09/25


Medical management


Requesting physician: VERENA Bell





- Chief Complaint


status post minimally invasive decompression fusion at L4-5, L5-S1





- History of Present Illness


This is a 64-year-old gentleman with past medical history significant for hypert

ension, psoriatic arthritis, childhood encephalitis , occasional marijuana use 

,admitted with high-grade spondylolisthesis with spinal stenosis and lower 

extremity radiculopathy, status post minimally invasive decompression and fusion

at L4-5 space L5-S1, postop day #1, tolerated procedure well.  Positive pain.  

Spontaneously voiding, Peralta catheter discontinued.  Denies nausea ,vomiting.  

Denies abdominal pain.  Mild tachycardia, denies chest pain, palpitations or 

shortness of breath.  Afebrile, WBC 11.65, hemoglobin 12.6, platelets 258.  

Electrolytes and renal function within normal limits.





1/10/2025 significant clinical improvement.  Currently denies  numbness or 

tingling -pain better controlled, tachycardia resolved.  Denies chest pain, 

palpitations or shortness of breath.  Nuys lightheadedness, dizziness or focal 

deficits.  Patient eager for discharge.





Objective





- Vital Signs


Vital signs: 


                                   Vital Signs











Temp  98.4 F   01/10/25 07:32


 


Pulse  120 H  01/10/25 07:32


 


Resp  16   01/10/25 07:32


 


BP  163/82   01/10/25 07:32


 


Pulse Ox  94 L  01/10/25 07:52


 


FiO2      








                                 Intake & Output











 01/09/25 01/10/25 01/10/25





 18:59 06:59 18:59


 


Intake Total  1380 


 


Output Total 2500  


 


Balance -2500 1380 


 


Intake:   


 


  Intake, IV Titration  900 





  Amount   


 


    Sodium Chloride 0.9% 1,  900 





    000 ml @ 75 mls/hr IV .   





    [id number] VIET Rx#:[id number]   


 


  Oral  480 


 


Output:   


 


  Urine 2500  


 


    Uretheral (Peralta) 1100  


 


Other:   


 


  Voiding Method Urinal Urinal Urinal


 


  # Voids  4 














- Exam





PHYSICAL EXAM:


VITAL SIGNS: [Reviewed]


GENERAL: Alert and oriented x 3, sitting up in bed, no acute distress


HEENT: Normocephalic, atraumatic conjunctivae normal. eyes normal. MMM.


NECK: Supple, no JVD. No LNs


CARDIOVASCULAR:  S1, S2 regular.. No murmur


RESPIRATION: Unlabored, equal air entry, clear to auscultation 


ABDOMEN:  Soft, nondistended, nontender . No guarding. no masses palpable. +BS.


LEGS:  No edema. no swelling 


NERVOUS SYSTEM:  Cranial N 2-12 grossly normal.  Moves all 4 limbs.


Skin: Warm and dry, no rash 








- Labs


CBC & Chem 7: 


                                 01/10/25 06:07





                                 01/09/25 05:16


Labs: 


                  Abnormal Lab Results - Last 24 Hours (Table)











  01/10/25 Range/Units





  06:07 


 


WBC  11.81 H  (4.50-10.00)  X 10*3/uL


 


RBC  4.24 L  (4.40-5.60)  X 10*6/uL


 


Hgb  12.4 L  (13.0-17.0)  g/dL


 


Hct  38.0 L  (39.6-50.0)  %














Assessment and Plan


Assessment: 


high-grade spondylolisthesis with spinal stenosis and lower extremity 

radiculopathy, status post minimally invasive decompression and fusion at L4-5 

space L5-S1, postop day #1


Gastroesophageal reflux disease


Hypertension


marijuana use, occasional














Plan: Continue on current medication regimen ,monitoring and symptomatic 

treatment.  Discharge planning in progress as per orthopedic spine; pain 

management, DVT prophylaxis as per orthopedic spine.Discussed with RN, vital 

signs significantly improved-with their charting to follow.patient has been 

advised to maintain aggressive pulmonary toileting with incentive spirometer .  

Follow-up with PCP, Dr. King in 1 week.














The impression and plan of care has been dictated as directed.





:


I performed a history and examination of this patient,  discussed the same with 

the dictator.  I agree with the dictator's note ,documented as a scribe.  Any 

additional findings or plans will be noted.

## 2025-01-10 NOTE — P.DS
Providers


Date of admission: 


01/09/25 15:19





Expected date of discharge: 01/10/25


Attending physician: 


VERENA Bell





Consults: 





                                        





01/08/25 14:38


Consult Physician Routine 


   Consulting Provider: Timo King Reason/Comments: Medical management


   Do you want consulting provider notified?: Yes











Primary care physician: 


Timo King








- Discharge Diagnosis(es)


(1) Spondylolisthesis, lumbar region


Current Visit: Yes   Status: Acute   





(2) Spondylolisthesis, lumbosacral region


Current Visit: Yes   Status: Acute   





(3) Lumbar spinal stenosis


Current Visit: Yes   Status: Acute   





(4) Lumbar spondylolysis


Current Visit: Yes   Status: Acute   





(5) Lumbar degenerative disc disease


Current Visit: Yes   Status: Acute   





(6) Lumbar facet arthropathy


Current Visit: Yes   Status: Acute   





(7) Facet arthropathy, lumbosacral


Current Visit: Yes   Status: Acute   





(8) Lumbar back pain with radiculopathy affecting right lower extremity


Current Visit: Yes   Status: Acute   





(9) Hypertension


Current Visit: Yes   Status: Acute   





(10) GERD (gastroesophageal reflux disease)


Current Visit: Yes   Status: Acute   





(11) Marijuana use


Current Visit: Yes   Status: Acute   


Hospital Course: 





This is a pleasant 64-year-old male who presented with L4-5 and L5-S1 spinal 

stenosis, dynamic spondylolisthesis, and severe facet arthritis with lower 

extremity radiculopathy, low back pain, lumbar degenerative disc disease, and L5

bilateral spondylolysis who failed outpatient conservative therapy.  He was 

admitted for an L4-5 and L5-S1 minimally invasive posterior lateral 

decompression and fusion with transforaminal lumbar interbody fusion.





The patient tolerated the procedure well and did well postoperatively.  He feels

his pain is well-controlled.  He is not currently complaining of any significant

lower extremity weakness or radiculopathy.  He was having significant difficulty

with right lower extremity leg pain prior to surgical intervention.  He has some

pain at the surgical sites at his lumbar spine but states his pain has been 

actively controlled with oral medications.  He states he has oral hydrocodone 

and cyclobenzaprine at home.  He does not feel he needs any pain medication at 

the time of discharge.  He feels he is ready for discharge today and is happy 

with his progress postoperatively.  Condition on day of discharge stable.  

Patient will be discharged home.  





Patient was cleared preoperatively for surgery by Dr. King.





Patient currently denies any nausea, vomiting, fever, or chills.  Patient is 

eating and voiding freely without difficulty.  Patient may shower Optifoam 

dressing intact.  Patient may remove Optifoam dressing in 3 days and shower 

without a dressing at that time.  Patient should refrain from driving until at 

least after their first follow-up appointment in the office.  Patient should 

avoid excessive bending, lifting, and twisting; no lifting greater than 10 

pounds.





Patient's other medical diagnoses include hypertension, GERD, and occasional 

marijuana use.





Patient should avoid anti-inflammatory medications over the next 6 weeks 

postoperatively.








Physical Exam on day of discharge:





Patient is awake, alert, and oriented 3


Vital signs stable


Good chest excursion with deep inspiration and expiration


No signs or symptoms of DVT; no calf pain; calves are soft nontender bilaterally


Extensor hallucis longus, plantarflexion, and dorsiflexion positive sustained 

bilateral lower extremities


Incision is dry and intact; no erythema, purulence, or signs of infection


Optifoam dressings are intact with 1 spot of dried blood over the left surgical 

dressing


Procedures: 





L4-5 and L5-S1 minimally invasive posterior lateral decompression and fusion 

with transforaminal lumbar interbody fusion.


Patient Condition at Discharge: Stable





Plan - Discharge Summary


Discharge Rx Participant: Yes


New Discharge Prescriptions: 


No Action


   Etanercept [Enbrel] 50 mg SQ MO


   HYDROcodone/APAP 7.5-325MG [Norco 7.5-325] 1 tab PO DAILY PRN


     PRN Reason: Pain


   Sennosides-Docusate Sodium [Senokot-S] 1 each PO DAILY PRN #20 tab


     PRN Reason: Constipation


   amLODIPine [Norvasc] 10 mg PO DAILY


   Cyclobenzaprine [Flexeril] 10 mg PO DAILY PRN


     PRN Reason: Muscle Spasm


   Famotidine [Pepcid] 20 mg PO DAILY


   Acetaminophen Tab [Tylenol] 650 mg PO Q6HR PRN  tab


     PRN Reason: Mild Pain Or Fever > 100.5


   Wheat Dextrin [Benefiber] 1 packet PO DAILY


Discharge Medication List





Etanercept [Enbrel] 50 mg SQ MO 09/20/16 [History]


Famotidine [Pepcid] 20 mg PO DAILY 11/28/24 [History]


HYDROcodone/APAP 7.5-325MG [Norco 7.5-325] 1 tab PO DAILY PRN 11/28/24 [History]


Acetaminophen Tab [Tylenol] 650 mg PO Q6HR PRN  tab 12/04/24 [Rx]


Sennosides-Docusate Sodium [Senokot-S] 1 each PO DAILY PRN #20 tab 12/04/24 [Rx]


Cyclobenzaprine [Flexeril] 10 mg PO DAILY PRN 01/03/25 [History]


Wheat Dextrin [Benefiber] 1 packet PO DAILY 01/03/25 [History]


amLODIPine [Norvasc] 10 mg PO DAILY 01/03/25 [History]








Follow up Appointment(s)/Referral(s): 


Moose Souza, FINESSE [PHYSICIAN ASSISTANT] - 2 Weeks


(Patient may follow-up with Moose Souza PA-C or Dr. Benedicto Bell at [facility] in 2-3 weeks following discharge.


)


Activity/Diet/Wound Care/Special Instructions: 


1.  Patient may shower with Optifoam dressing intact.  


2.  Patient may remove Optifoam dressing in 3 days and shower without a dressing

at that time.  


3.  Patient should refrain from driving until at least after their first follow-

up appointment in the office.


4.  Patient should avoid excessive bending, twisting, lifting; avoid overhead 

lifting; no lifting greater than 10 pounds


5.  Take medications as prescribed


6.  Patient should avoid anti-inflammatory medications over the next 6 weeks 

postoperatively


7.  He may utilize walker to aid in ambulation as needed


8.  Do not soak in tub


Discharge Disposition: HOME SELF-CARE